# Patient Record
Sex: FEMALE | Race: BLACK OR AFRICAN AMERICAN | Employment: FULL TIME | URBAN - METROPOLITAN AREA
[De-identification: names, ages, dates, MRNs, and addresses within clinical notes are randomized per-mention and may not be internally consistent; named-entity substitution may affect disease eponyms.]

---

## 2018-04-26 ENCOUNTER — HOSPITAL ENCOUNTER (OUTPATIENT)
Dept: GENERAL RADIOLOGY | Age: 33
Discharge: HOME | End: 2018-04-26
Attending: INTERNAL MEDICINE
Payer: MEDICAID

## 2018-04-26 DIAGNOSIS — M25.512 SHOULDER PAIN, LEFT: ICD-10-CM

## 2018-04-26 DIAGNOSIS — M54.9 BACK PAIN: ICD-10-CM

## 2018-04-26 PROCEDURE — 72110 X-RAY EXAM L-2 SPINE 4/>VWS: CPT

## 2018-04-26 PROCEDURE — 73030 X-RAY EXAM OF SHOULDER: CPT

## 2018-07-09 ENCOUNTER — HOSPITAL ENCOUNTER (OUTPATIENT)
Dept: MRI IMAGING | Age: 33
Discharge: HOME | End: 2018-07-09
Attending: INTERNAL MEDICINE

## 2018-07-20 ENCOUNTER — HOSPITAL ENCOUNTER (OUTPATIENT)
Dept: MRI IMAGING | Age: 33
Discharge: HOME | End: 2018-07-20
Attending: INTERNAL MEDICINE
Payer: MEDICAID

## 2018-07-20 DIAGNOSIS — M54.9 BACK PAIN: ICD-10-CM

## 2018-07-20 PROCEDURE — 72148 MRI LUMBAR SPINE W/O DYE: CPT

## 2018-12-20 ENCOUNTER — HOSPITAL ENCOUNTER (OUTPATIENT)
Dept: MAMMOGRAPHY | Age: 33
Discharge: HOME | End: 2018-12-20
Attending: INTERNAL MEDICINE
Payer: MEDICAID

## 2018-12-20 DIAGNOSIS — N60.02 CYST OF LEFT BREAST: ICD-10-CM

## 2018-12-20 PROCEDURE — 77066 DX MAMMO INCL CAD BI: CPT

## 2019-04-09 ENCOUNTER — HOSPITAL ENCOUNTER (OUTPATIENT)
Dept: MAMMOGRAPHY | Age: 34
Discharge: HOME | End: 2019-04-09
Attending: INTERNAL MEDICINE
Payer: MEDICAID

## 2019-04-09 DIAGNOSIS — N63.20 LEFT BREAST MASS: ICD-10-CM

## 2019-04-09 PROCEDURE — 77065 DX MAMMO INCL CAD UNI: CPT

## 2020-07-08 ENCOUNTER — HOSPITAL ENCOUNTER (OUTPATIENT)
Dept: GENERAL RADIOLOGY | Age: 35
Discharge: HOME | End: 2020-07-08
Attending: INTERNAL MEDICINE
Payer: MEDICAID

## 2020-07-08 DIAGNOSIS — G89.29 CHRONIC TMJ PAIN: ICD-10-CM

## 2020-07-08 DIAGNOSIS — M26.629 CHRONIC TMJ PAIN: ICD-10-CM

## 2020-07-08 PROCEDURE — 70330 X-RAY EXAM OF JAW JOINTS: CPT

## 2022-05-26 ENCOUNTER — HOSPITAL ENCOUNTER (OUTPATIENT)
Dept: GENERAL RADIOLOGY | Age: 37
Discharge: HOME | End: 2022-05-29
Payer: COMMERCIAL

## 2022-05-26 DIAGNOSIS — M54.50 LOW BACK PAIN, UNSPECIFIED BACK PAIN LATERALITY, UNSPECIFIED CHRONICITY, UNSPECIFIED WHETHER SCIATICA PRESENT: ICD-10-CM

## 2022-05-26 PROCEDURE — 72100 X-RAY EXAM L-S SPINE 2/3 VWS: CPT

## 2022-12-19 ENCOUNTER — HOSPITAL ENCOUNTER (EMERGENCY)
Age: 37
Discharge: HOME | End: 2022-12-19
Attending: EMERGENCY MEDICINE
Payer: MEDICAID

## 2022-12-19 VITALS
RESPIRATION RATE: 16 BRPM | SYSTOLIC BLOOD PRESSURE: 135 MMHG | HEART RATE: 81 BPM | TEMPERATURE: 98.8 F | BODY MASS INDEX: 47.09 KG/M2 | DIASTOLIC BLOOD PRESSURE: 90 MMHG | OXYGEN SATURATION: 97 % | HEIGHT: 66 IN | WEIGHT: 293 LBS

## 2022-12-19 DIAGNOSIS — E11.65 UNCONTROLLED TYPE 2 DIABETES MELLITUS WITH HYPERGLYCEMIA (HCC): ICD-10-CM

## 2022-12-19 DIAGNOSIS — R73.9 HYPERGLYCEMIA: Primary | ICD-10-CM

## 2022-12-19 LAB
ALBUMIN SERPL-MCNC: 4 G/DL (ref 3.5–5)
ALBUMIN/GLOB SERPL: 1.3 {RATIO} (ref 0.4–1.6)
ALP SERPL-CCNC: 126 U/L (ref 45–117)
ALT SERPL-CCNC: 32 U/L (ref 13–61)
ANION GAP SERPL CALC-SCNC: 13 MMOL/L (ref 2–11)
APPEARANCE UR: CLEAR
AST SERPL-CCNC: 35 U/L (ref 15–37)
BASE DEFICIT BLDV-SCNC: 1.3 MMOL/L
BASOPHILS # BLD: 0 K/UL (ref 0–0.2)
BASOPHILS NFR BLD: 0 % (ref 0–2)
BILIRUB SERPL-MCNC: 0.2 MG/DL (ref 0.2–1.1)
BILIRUB UR QL: NEGATIVE
BUN SERPL-MCNC: 13 MG/DL (ref 7–18)
CALCIUM SERPL-MCNC: 9 MG/DL (ref 8.3–10.4)
CHLORIDE SERPL-SCNC: 99 MMOL/L (ref 98–107)
CHP ED QC CHECK: YES
CO2 BLD-SCNC: 25 MMOL/L (ref 13–23)
CO2 SERPL-SCNC: 22 MMOL/L (ref 21–32)
COLOR UR: YELLOW
CREAT SERPL-MCNC: 0.9 MG/DL (ref 0.6–1)
DIFFERENTIAL METHOD BLD: ABNORMAL
EOSINOPHIL # BLD: 0.2 K/UL (ref 0–0.8)
EOSINOPHIL NFR BLD: 2 % (ref 0.5–7.8)
ERYTHROCYTE [DISTWIDTH] IN BLOOD BY AUTOMATED COUNT: 12.2 % (ref 11.9–14.6)
EST. AVERAGE GLUCOSE BLD GHB EST-MCNC: 232 MG/DL
GLOBULIN SER CALC-MCNC: 3 G/DL (ref 2.8–4.5)
GLUCOSE BLD STRIP.AUTO-MCNC: 254 MG/DL (ref 65–100)
GLUCOSE BLD STRIP.AUTO-MCNC: 327 MG/DL (ref 65–100)
GLUCOSE BLD-MCNC: 327 MG/DL
GLUCOSE SERPL-MCNC: 346 MG/DL (ref 65–100)
GLUCOSE UR STRIP.AUTO-MCNC: 500 MG/DL
HBA1C MFR BLD: 9.7 % (ref 4.8–5.6)
HCG UR QL: NEGATIVE
HCO3 BLDV-SCNC: 24 MMOL/L (ref 23–28)
HCT VFR BLD AUTO: 40.1 % (ref 35.8–46.3)
HGB BLD-MCNC: 13.7 G/DL (ref 11.7–15.4)
HGB UR QL STRIP: NEGATIVE
IMM GRANULOCYTES # BLD AUTO: 0 K/UL (ref 0–0.5)
IMM GRANULOCYTES NFR BLD AUTO: 0 % (ref 0–5)
KETONES UR QL STRIP.AUTO: 40 MG/DL
LEUKOCYTE ESTERASE UR QL STRIP.AUTO: NEGATIVE
LYMPHOCYTES # BLD: 3.6 K/UL (ref 0.5–4.6)
LYMPHOCYTES NFR BLD: 32 % (ref 13–44)
MCH RBC QN AUTO: 30.2 PG (ref 26.1–32.9)
MCHC RBC AUTO-ENTMCNC: 34.2 G/DL (ref 31.4–35)
MCV RBC AUTO: 88.3 FL (ref 82–102)
MONOCYTES # BLD: 0.7 K/UL (ref 0.1–1.3)
MONOCYTES NFR BLD: 6 % (ref 4–12)
NEUTS SEG # BLD: 6.6 K/UL (ref 1.7–8.2)
NEUTS SEG NFR BLD: 59 % (ref 43–78)
NITRITE UR QL STRIP.AUTO: NEGATIVE
NRBC # BLD: 0 K/UL (ref 0–0.2)
PH BLDV: 7.37 [PH] (ref 7.32–7.42)
PH UR STRIP: 6.5 [PH] (ref 5–9)
PLATELET # BLD AUTO: 279 K/UL (ref 150–450)
PMV BLD AUTO: 10.9 FL (ref 9.4–12.3)
PO2 BLDV: 70 MMHG
POTASSIUM SERPL-SCNC: 4.1 MMOL/L (ref 3.5–5.1)
PROT SERPL-MCNC: 7 G/DL (ref 6.4–8.2)
PROT UR STRIP-MCNC: NEGATIVE MG/DL
RBC # BLD AUTO: 4.54 M/UL (ref 4.05–5.2)
SAO2 % BLDV: 93.2 % (ref 65–88)
SERVICE CMNT-IMP: ABNORMAL
SODIUM SERPL-SCNC: 134 MMOL/L (ref 133–143)
SP GR UR REFRACTOMETRY: 1.02 (ref 1–1.02)
SPECIMEN TYPE: ABNORMAL
UROBILINOGEN UR QL STRIP.AUTO: 0.2 EU/DL (ref 0.2–1)
WBC # BLD AUTO: 11.2 K/UL (ref 4.3–11.1)

## 2022-12-19 PROCEDURE — 82962 GLUCOSE BLOOD TEST: CPT

## 2022-12-19 PROCEDURE — 2580000003 HC RX 258: Performed by: EMERGENCY MEDICINE

## 2022-12-19 PROCEDURE — 85025 COMPLETE CBC W/AUTO DIFF WBC: CPT

## 2022-12-19 PROCEDURE — 80053 COMPREHEN METABOLIC PANEL: CPT

## 2022-12-19 PROCEDURE — 6370000000 HC RX 637 (ALT 250 FOR IP): Performed by: EMERGENCY MEDICINE

## 2022-12-19 PROCEDURE — 81003 URINALYSIS AUTO W/O SCOPE: CPT

## 2022-12-19 PROCEDURE — 99284 EMERGENCY DEPT VISIT MOD MDM: CPT

## 2022-12-19 PROCEDURE — 96374 THER/PROPH/DIAG INJ IV PUSH: CPT

## 2022-12-19 PROCEDURE — 83036 HEMOGLOBIN GLYCOSYLATED A1C: CPT

## 2022-12-19 PROCEDURE — 82803 BLOOD GASES ANY COMBINATION: CPT

## 2022-12-19 PROCEDURE — 81025 URINE PREGNANCY TEST: CPT

## 2022-12-19 RX ORDER — 0.9 % SODIUM CHLORIDE 0.9 %
1000 INTRAVENOUS SOLUTION INTRAVENOUS ONCE
Status: COMPLETED | OUTPATIENT
Start: 2022-12-19 | End: 2022-12-19

## 2022-12-19 RX ADMIN — INSULIN HUMAN 5 UNITS: 100 INJECTION, SOLUTION PARENTERAL at 01:41

## 2022-12-19 RX ADMIN — SODIUM CHLORIDE 1000 ML: 9 INJECTION, SOLUTION INTRAVENOUS at 00:49

## 2022-12-19 ASSESSMENT — ENCOUNTER SYMPTOMS
DIARRHEA: 0
SHORTNESS OF BREATH: 0
VOMITING: 0
RHINORRHEA: 0
ABDOMINAL PAIN: 0
COUGH: 0
NAUSEA: 0

## 2022-12-19 ASSESSMENT — PAIN - FUNCTIONAL ASSESSMENT: PAIN_FUNCTIONAL_ASSESSMENT: 0-10

## 2022-12-19 ASSESSMENT — PAIN SCALES - GENERAL: PAINLEVEL_OUTOF10: 0

## 2022-12-19 NOTE — ED NOTES
Report received from [facility].   Assumed care of the pt     Micha Morelos RN  12/19/22 1945

## 2022-12-19 NOTE — ED NOTES
Pt presents to the ED for c/o elevated BS.   States that she does take 5420 Martin Landry RN  12/19/22 1048

## 2022-12-19 NOTE — ED NOTES
I have reviewed discharge instructions with the patient. The patient verbalized understanding. Patient left ED via Discharge Method:ambulatory to Home with (family self,). Opportunity for questions and clarification provided. Patient given 1 scripts. To continue your aftercare when you leave the hospital, you may receive an automated call from our care team to check in on how you are doing. This is a free service and part of our promise to provide the best care and service to meet your aftercare needs.  If you have questions, or wish to unsubscribe from this service please call [phone]. Thank you for Choosing our [facility] Emergency Department.        Alexandru No RN  12/19/22 1144

## 2022-12-19 NOTE — DISCHARGE INSTRUCTIONS
Please closely monitor blood glucose and follow diabetic diet. Will switch to metformin 500 mg twice daily. Schedule close follow-up with your primary care physician. Please return if symptoms worsen or progress in any way.

## 2022-12-19 NOTE — ED PROVIDER NOTES
Emergency Department Provider Note                   PCP:                Han Elizabeth MD               Age: 43 y.o. Sex: female       ICD-10-CM    1. Hyperglycemia  R73.9       2. Uncontrolled type 2 diabetes mellitus with hyperglycemia ([facility] [address]  E11.65           DISPOSITION Decision To Discharge 12/19/2022 02:10:46 AM        MDM  Number of Diagnoses or Management Options  Hyperglycemia: new, needed workup  Uncontrolled type 2 diabetes mellitus with hyperglycemia ([address]): new, needed workup  Diagnosis management comments: Normotensive. Afebrile. POC glucose 327. Orders placed for 1 L normal saline IV fluid bolus. Will order A1c which is send out lab to DT lab. Repeat glucose 254. UA pending. Urine preg negative. CO2 within normal limits at 22. Anion gap 13. UA with 40 ketones. Will check VBG. Pt with prolonged wait time in ED due to issues with epoc blood analysis device. Ultimately, VBG w/ pH 7.367. Will discharge home with instructions to take metformin 500 mg twice daily. Patient instructed on need for close follow-up with her PCP for further management. Instructed to follow diabetic diet. A1c is pending at this time. Amount and/or Complexity of Data Reviewed  Clinical lab tests: ordered and reviewed  Tests in the medicine section of CPT®: ordered and reviewed  Review and summarize past medical records: yes  Independent visualization of images, tracings, or specimens: yes    Risk of Complications, Morbidity, and/or Mortality  Presenting problems: moderate  Diagnostic procedures: moderate  Management options: moderate    Patient Progress  Patient progress: stable       ED Course as of 12/19/22 0258   Mon Dec 19, 2022   0101 Glucose, Random: 327 [DF]   0135 Glucose, Random(!): 346 [DF]   0240 Repeat glucose 254. UA pending. Normal CO2. AG 13. No acute concern at this time for DKA.   [DF]   3787 Ketones, Urine(!): 40 [DF]      ED Course User Index  [DF] Hannah Casas MD Orders Placed This Encounter   Procedures    CMP    CBC with Auto Differential    Pregnancy, Urine    Urinalysis w rflx microscopic    Hemoglobin A1C    Blood Gas, Venous    Misc nursing order (specify)    POCT Glucose    POCT Glucose    POCT Glucose    POCT Glucose    EKG 12 Lead        Medications   0.9 % sodium chloride bolus (0 mLs IntraVENous Stopped 12/19/22 0100)   insulin regular (HUMULIN R;NOVOLIN R) injection 5 Units (5 Units IntraVENous Given 12/19/22 0141)       New Prescriptions    METFORMIN (GLUCOPHAGE) 500 MG TABLET    Take 1 tablet by mouth 2 times daily (with meals)        Brisa Eden is a 43 y.o. female who presents to the Emergency Department with chief complaint of  hyperglycemia. Chief Complaint   Patient presents with    Blood Sugar Problem      18-year-old female with history of obesity, type 2 diabetes mellitus on metformin 5 mg once daily presents with complaint of hyperglycemia. Patient states that she has been checking her glucose at home she noted that has been significantly elevated. Patient states that yesterday blood glucose was elevated in the 400s. Patient states that today it has fluctuated from mid 200s to 300s. Patient states that she has had polyuria, polydipsia. Denies chest pain, shortness of breath, nausea, vomiting, fever, chills, abdominal pain, pelvic pain, dysuria. Patient states that her primary care doctor is Dr. Kasey Bro and was last seen in October. Denies any alleviating exacerbating factors. Rates symptoms as mild to moderate. States that she has not been following diabetic diet. States that she has been under increased stress recently. Reports drinking half a pint of Shahla on Friday as well as 2 Michelob ultra's. Patient smokes around a pack per day. States she has not been following diabetic diet. The history is provided by the patient. No  was used. Review of Systems   Constitutional:  Positive for fatigue. Negative for chills, diaphoresis and fever. HENT:  Negative for congestion and rhinorrhea. Eyes:  Negative for visual disturbance. Respiratory:  Negative for cough and shortness of breath. Cardiovascular:  Negative for chest pain and palpitations. Gastrointestinal:  Negative for abdominal pain, diarrhea, nausea and vomiting. Endocrine: Positive for polydipsia and polyuria. Genitourinary:  Negative for dysuria and flank pain. Musculoskeletal:  Negative for arthralgias and myalgias. Skin:  Negative for rash and wound. Neurological:  Negative for dizziness, syncope, weakness, light-headedness, numbness and headaches. Hematological:  Does not bruise/bleed easily. Psychiatric/Behavioral:  Negative for confusion. Past Medical History:   Diagnosis Date    Ill-defined condition         Past Medical history: Type II diabetes mellitus    Past surgical history: Denies any previous surgical history. Family history: Reviewed. Denies any pertinent family history. Social history: Smoking 1 pack/day for 10 years plus. Reports intermittent alcohol use. States that Friday evening she drank half a pint of Shahla as well as 2 Michelob ultra beers. Denies illicit drug use. Social History     Socioeconomic History    Marital status: Single     Spouse name: None    Number of children: None    Years of education: None    Highest education level: None   Tobacco Use    Smoking status: Former     Packs/day: 0.50     Types: Cigarettes   Substance and Sexual Activity    Alcohol use: No    Drug use: No         Hydrocodone-acetaminophen     Previous Medications    AMLODIPINE (NORVASC) 5 MG TABLET    Take 5 mg by mouth daily    LORAZEPAM (ATIVAN) 1 MG TABLET    Take 1 mg by mouth every 8 hours as needed. Vitals signs and nursing note reviewed.    Patient Vitals for the past 4 hrs:   Temp Pulse Resp BP SpO2   12/19/22 0229 -- 81 -- (!) 137/93 95 %   12/19/22 0214 -- 74 -- 122/79 98 %   12/19/22 0159 -- 76 -- 135/72 98 %   12/19/22 0144 -- 79 -- (!) 150/128 99 %   12/19/22 0129 -- 75 -- (!) 146/75 99 %   12/19/22 0114 -- 75 -- (!) 144/77 98 %   12/19/22 0059 -- 90 -- (!) 144/88 98 %   12/19/22 0036 98.8 °F (37.1 °C) 98 18 (!) 151/99 100 %          Physical Exam  Vitals and nursing note reviewed. Constitutional:       Appearance: She is obese. HENT:      Head: Normocephalic. Nose: Nose normal.      Mouth/Throat:      Mouth: Mucous membranes are moist.      Comments: MMM. Eyes:      Extraocular Movements: Extraocular movements intact. Conjunctiva/sclera: Conjunctivae normal.      Pupils: Pupils are equal, round, and reactive to light. Cardiovascular:      Rate and Rhythm: Normal rate. Pulses: Normal pulses. Heart sounds: Normal heart sounds. Pulmonary:      Effort: Pulmonary effort is normal.      Breath sounds: Normal breath sounds. Comments: CTAB. Abdominal:      General: Bowel sounds are normal.      Palpations: Abdomen is soft. Tenderness: There is no abdominal tenderness. There is no guarding or rebound. Comments: Soft, nontender, nondistended. No rebound or guarding. Musculoskeletal:         General: No tenderness. Normal range of motion. Skin:     General: Skin is warm. Findings: No erythema or rash. Neurological:      General: No focal deficit present. Mental Status: She is alert and oriented to person, place, and time. Cranial Nerves: No cranial nerve deficit. Sensory: No sensory deficit. Motor: No weakness.         EKG 12 Lead    Date/Time: 12/19/2022 1:05 AM  Performed by: Sheldon Swann MD  Authorized by: Sheldon Swann MD     ECG reviewed by ED Physician in the absence of a cardiologist: yes    Rate:     ECG rate:  95    ECG rate assessment: normal    Rhythm:     Rhythm: sinus rhythm    Ectopy:     Ectopy: none    QRS:     QRS axis:  Normal    QRS intervals:  Normal    QRS conduction: normal    ST segments:     ST segments:  Normal  T waves:     T waves: normal      Results for orders placed or performed during the hospital encounter of 12/19/22   CMP   Result Value Ref Range    Sodium 134 133 - 143 mmol/L    Potassium 4.1 3.5 - 5.1 mmol/L    Chloride 99 98 - 107 mmol/L    CO2 22 21 - 32 mmol/L    Anion Gap 13 (H) 2 - 11 mmol/L    Glucose 346 (H) 65 - 100 mg/dL    BUN 13 7.0 - 18.0 MG/DL    Creatinine 0.90 0.6 - 1.0 MG/DL    Est, Glom Filt Rate >60 >60 ml/min/1.73m2    Calcium 9.0 8.3 - 10.4 MG/DL    Total Bilirubin 0.2 0.2 - 1.1 MG/DL    ALT 32 13.0 - 61.0 U/L    AST 35 15 - 37 U/L    Alk Phosphatase 126 (H) 45.0 - 117.0 U/L    Total Protein 7.0 6.4 - 8.2 g/dL    Albumin 4.0 3.5 - 5.0 g/dL    Globulin 3.0 2.8 - 4.5 g/dL    Albumin/Globulin Ratio 1.3 0.4 - 1.6     CBC with Auto Differential   Result Value Ref Range    WBC 11.2 (H) 4.3 - 11.1 K/uL    RBC 4.54 4.05 - 5.20 M/uL    Hemoglobin 13.7 11.7 - 15.4 g/dL    Hematocrit 40.1 35.8 - 46.3 %    MCV 88.3 82.0 - 102.0 FL    MCH 30.2 26.1 - 32.9 PG    MCHC 34.2 31.4 - 35.0 g/dL    RDW 12.2 11.9 - 14.6 %    Platelets 077 776 - 460 K/uL    MPV 10.9 9.4 - 12.3 FL    nRBC 0.00 0.0 - 0.2 K/uL    Differential Type AUTOMATED      Seg Neutrophils 59 43 - 78 %    Lymphocytes 32 13 - 44 %    Monocytes 6 4.0 - 12.0 %    Eosinophils % 2 0.5 - 7.8 %    Basophils 0 0.0 - 2.0 %    Immature Granulocytes 0 0.0 - 5.0 %    Segs Absolute 6.6 1.7 - 8.2 K/UL    Absolute Lymph # 3.6 0.5 - 4.6 K/UL    Absolute Mono # 0.7 0.1 - 1.3 K/UL    Absolute Eos # 0.2 0.0 - 0.8 K/UL    Basophils Absolute 0.0 0.0 - 0.2 K/UL    Absolute Immature Granulocyte 0.0 0.0 - 0.5 K/UL   Pregnancy, Urine   Result Value Ref Range    HCG(Urine) Pregnancy Test Negative     Urinalysis w rflx microscopic   Result Value Ref Range    Color, UA YELLOW      Appearance CLEAR      Specific Gravity, UA 1.020 1.001 - 1.023      pH, Urine 6.5 5.0 - 9.0      Protein, UA Negative NEG mg/dL    Glucose,  mg/dL Ketones, Urine 40 (A) NEG mg/dL    Bilirubin Urine Negative NEG      Blood, Urine Negative NEG      Urobilinogen, Urine 0.2 0.2 - 1.0 EU/dL    Nitrite, Urine Negative NEG      Leukocyte Esterase, Urine Negative NEG     POCT Glucose   Result Value Ref Range    Glucose 327 mg/dL    QC OK? yes    POCT Glucose   Result Value Ref Range    POC Glucose 327 (H) 65 - 100 mg/dL    Performed by: [place])    POCT Glucose   Result Value Ref Range    POC Glucose 254 (H) 65 - 100 mg/dL    Performed by: [place])    EKG 12 Lead    Narrative    Tolu Faustin MD     12/19/2022  2:45 AM  EKG 12 Lead    Date/Time: 12/19/2022 1:05 AM  Performed by: Linda Horton MD  Authorized by: Linda Horton MD     ECG reviewed by ED Physician in the absence of a cardiologist: yes    Rate:     ECG rate:  95    ECG rate assessment: normal    Rhythm:     Rhythm: sinus rhythm    Ectopy:     Ectopy: none    QRS:     QRS axis:  Normal    QRS intervals:  Normal    QRS conduction: normal    ST segments:     ST segments:  Normal  T waves:     T waves: normal          No orders to display                       Voice dictation software was used during the making of this note. This software is not perfect and grammatical and other typographical errors may be present. This note has not been completely proofread for errors.      Linda Horton MD  12/19/22 0245       Tolu Faustin MD  12/19/22 4604

## 2023-01-22 ENCOUNTER — HOSPITAL ENCOUNTER (EMERGENCY)
Age: 38
Discharge: HOME | End: 2023-01-22
Attending: EMERGENCY MEDICINE
Payer: MEDICAID

## 2023-01-22 VITALS
TEMPERATURE: 98.4 F | BODY MASS INDEX: 47.09 KG/M2 | RESPIRATION RATE: 18 BRPM | HEART RATE: 83 BPM | SYSTOLIC BLOOD PRESSURE: 128 MMHG | OXYGEN SATURATION: 100 % | HEIGHT: 66 IN | DIASTOLIC BLOOD PRESSURE: 82 MMHG | WEIGHT: 293 LBS

## 2023-01-22 DIAGNOSIS — L02.91 ABSCESS: Primary | ICD-10-CM

## 2023-01-22 PROCEDURE — 87205 SMEAR GRAM STAIN: CPT

## 2023-01-22 PROCEDURE — 99283 EMERGENCY DEPT VISIT LOW MDM: CPT

## 2023-01-22 PROCEDURE — 87075 CULTR BACTERIA EXCEPT BLOOD: CPT

## 2023-01-22 PROCEDURE — 10060 I&D ABSCESS SIMPLE/SINGLE: CPT

## 2023-01-22 PROCEDURE — 10120 INC&RMVL FB SUBQ TISS SMPL: CPT

## 2023-01-22 RX ORDER — OMEPRAZOLE 40 MG/1
CAPSULE, DELAYED RELEASE ORAL
COMMUNITY
Start: 2019-11-03

## 2023-01-22 RX ORDER — ERGOCALCIFEROL 1.25 MG/1
CAPSULE ORAL
COMMUNITY
Start: 2019-11-11

## 2023-01-22 RX ORDER — ESCITALOPRAM OXALATE 20 MG/1
TABLET ORAL
COMMUNITY

## 2023-01-22 RX ORDER — LISINOPRIL 5 MG/1
5 TABLET ORAL
COMMUNITY

## 2023-01-22 RX ORDER — DOXYCYCLINE HYCLATE 100 MG
100 TABLET ORAL 2 TIMES DAILY
Qty: 20 TABLET | Refills: 0 | Status: SHIPPED | OUTPATIENT
Start: 2023-01-22 | End: 2023-01-24 | Stop reason: ALTCHOICE

## 2023-01-22 ASSESSMENT — PAIN SCALES - GENERAL: PAINLEVEL_OUTOF10: 7

## 2023-01-22 ASSESSMENT — ENCOUNTER SYMPTOMS
VOMITING: 0
RESPIRATORY NEGATIVE: 1
GASTROINTESTINAL NEGATIVE: 1
ALLERGIC/IMMUNOLOGIC NEGATIVE: 1
EYES NEGATIVE: 1
NAUSEA: 0

## 2023-01-22 ASSESSMENT — LIFESTYLE VARIABLES
HOW MANY STANDARD DRINKS CONTAINING ALCOHOL DO YOU HAVE ON A TYPICAL DAY: PATIENT DOES NOT DRINK
HOW OFTEN DO YOU HAVE A DRINK CONTAINING ALCOHOL: NEVER

## 2023-01-22 ASSESSMENT — PAIN DESCRIPTION - LOCATION: LOCATION: GROIN

## 2023-01-22 ASSESSMENT — PAIN DESCRIPTION - PAIN TYPE: TYPE: ACUTE PAIN

## 2023-01-22 ASSESSMENT — PAIN - FUNCTIONAL ASSESSMENT: PAIN_FUNCTIONAL_ASSESSMENT: 0-10

## 2023-01-22 ASSESSMENT — PAIN DESCRIPTION - ORIENTATION: ORIENTATION: RIGHT

## 2023-01-22 NOTE — ED NOTES
I have reviewed discharge instructions with the patient. The patient verbalized understanding. Patient left ED via Discharge Method: ambulatory to Home with self. Opportunity for questions and clarification provided. Patient given 1 scripts. To continue your aftercare when you leave the hospital, you may receive an automated call from our care team to check in on how you are doing. This is a free service and part of our promise to provide the best care and service to meet your aftercare needs.  If you have questions, or wish to unsubscribe from this service please call [phone]. Thank you for Choosing our [facility] Emergency Department.         Andrés Fierro RN  01/22/23 7078

## 2023-01-22 NOTE — ED PROVIDER NOTES
Emergency Department Provider Note                   PCP:                Pj Hinojosa MD               Age: 37 y.o.      Sex: female       ICD-10-CM    1. Abscess  L02.91           DISPOSITION Decision To Discharge 01/22/2023 01:05:37 PM        Medical Decision Making  Wash two-three times daily with soap and water, blot dry, apply neosporin and a clean dressing. Watch for redness, swelling, pus, increasing pain, fever and return if any of those symptoms begin. Finish all of the antibiotics. Return to the ED in two days for packing removal and sooner if worse. Patient is stable for discharge and ambulatory out of the ED without difficulty.   Patient presented with acute abscess to the ED. History obtained from patient. I ordered the following tests wound culture, no hx  of MRSA that she is aware of but prior abscesses. I did review records from an external source. I did engage in shared decision making with the patient/family. I considered blood work but did not do it due to normal vital signs, no abnormalities, abscess superficial. There is no social determinant of health affecting care. I did not use a clinical guideline to determine care of the patient. I discussed results/disposition with patient.         Amount and/or Complexity of Data Reviewed  Labs: ordered. Decision-making details documented in ED Course.    Risk  Prescription drug management.                                Orders Placed This Encounter   Procedures    INCISION AND DRAINAGE    Culture, Anaerobic    Culture, Wound Aerobic Only        Medications - No data to display    New Prescriptions    DOXYCYCLINE HYCLATE (VIBRA-TABS) 100 MG TABLET    Take 1 tablet by mouth 2 times daily for 10 days        Bia Doshi is a 37 y.o. female who presents to the Emergency Department with chief complaint of    Chief Complaint   Patient presents with    Abscess      Patient is here with a abscess to the right lower quadrant of the anterior abdomen that  is superficial.  It started 2 days ago. She is a diabetic. She has had them in her breast before and 1 from her breast had to be lanced. No fever, chest pain, shortness of breath, upper abdominal pain, dizziness, weakness, dyspnea exertion, orthopnea, swelling/tingling or weakness to their arms or legs, trouble with urination or bowel movements or other new symptoms. They did ambulate to the room without difficulty and is well hydrated. The history is provided by the patient. Abscess  Location:  Torso  Torso abscess location:  Abd RLQ  Size:  2x2 cm  Abscess quality: fluctuance, painful and redness    Abscess quality: not draining, no induration, no itching, no warmth and not weeping    Red streaking: no    Duration:  2 days  Progression:  Unchanged  Pain details:     Severity:  Moderate    Duration:  2 days    Timing:  Constant    Progression:  Worsening  Chronicity:  New  Context: diabetes    Context: not immunosuppression, not injected drug use, not insect bite/sting and not skin injury    Relieved by:  Nothing  Worsened by:  Nothing  Ineffective treatments:  None tried  Associated symptoms: no anorexia, no fatigue, no fever, no headaches, no nausea and no vomiting        Review of Systems   Constitutional: Negative. Negative for fatigue and fever. HENT: Negative. Eyes: Negative. Respiratory: Negative. Cardiovascular: Negative. Gastrointestinal: Negative. Negative for anorexia, nausea and vomiting. Endocrine: Negative. Genitourinary: Negative. Musculoskeletal: Negative. Skin:  Negative for wound. Allergic/Immunologic: Negative. Neurological: Negative. Negative for headaches. Hematological: Negative. Psychiatric/Behavioral: Negative. All other systems reviewed and are negative. Past Medical History:   Diagnosis Date    Ill-defined condition         No past surgical history on file. No family history on file.      Social History     Socioeconomic History Marital status: Single   Tobacco Use    Smoking status: Former     Packs/day: 0.50     Types: Cigarettes   Substance and Sexual Activity    Alcohol use: No    Drug use: No         Hydrocodone-acetaminophen     Previous Medications    AMLODIPINE (NORVASC) 5 MG TABLET    Take 5 mg by mouth daily    ERGOCALCIFEROL (ERGOCALCIFEROL) 1.25 MG (53542 UT) CAPSULE    TAKE 1 TABLET BY MOUTH THREE TIMES A MONTH    ESCITALOPRAM (LEXAPRO) 20 MG TABLET    escitalopram 20 mg tablet   TAKE 1 TABLET BY MOUTH EVERY DAY    LISINOPRIL (PRINIVIL;ZESTRIL) 5 MG TABLET    Take 5 mg by mouth    LORAZEPAM (ATIVAN) 1 MG TABLET    Take 1 mg by mouth every 8 hours as needed. METFORMIN (GLUCOPHAGE) 500 MG TABLET    Take 1 tablet by mouth 2 times daily (with meals)    OMEPRAZOLE (PRILOSEC) 40 MG DELAYED RELEASE CAPSULE    omeprazole 40 mg capsule,delayed release   Take 1 capsule every day by oral route. Vitals signs and nursing note reviewed. Patient Vitals for the past 4 hrs:   Temp Pulse Resp BP SpO2   01/22/23 1300 -- -- -- 128/82 100 %   01/22/23 1201 98.4 °F (36.9 °C) 83 18 (!) 153/80 95 %          Physical Exam  Vitals and nursing note reviewed. Constitutional:       Appearance: Normal appearance. She is obese. HENT:      Head: Normocephalic and atraumatic. Right Ear: External ear normal.      Left Ear: External ear normal.      Nose: Nose normal.      Mouth/Throat:      Mouth: Mucous membranes are moist.   Eyes:      Extraocular Movements: Extraocular movements intact. Conjunctiva/sclera: Conjunctivae normal.      Pupils: Pupils are equal, round, and reactive to light. Cardiovascular:      Rate and Rhythm: Normal rate. Pulses: Normal pulses. Pulmonary:      Effort: Pulmonary effort is normal.   Abdominal:      General: Abdomen is flat. Bowel sounds are normal.      Palpations: Abdomen is soft.           Comments: 2x2 cm fluctuant abscess to right lower abdomen   Musculoskeletal:         General: Normal range of motion. Cervical back: Normal range of motion. Skin:     General: Skin is warm. Capillary Refill: Capillary refill takes less than 2 seconds. Coloration: Skin is not jaundiced or pale. Findings: Erythema present. No bruising, lesion or rash. Neurological:      General: No focal deficit present. Mental Status: She is alert and oriented to person, place, and time. Mental status is at baseline. Psychiatric:         Mood and Affect: Mood normal.         Behavior: Behavior normal.         Thought Content:  Thought content normal.         Judgment: Judgment normal.        Incision/Drainage    Date/Time: 1/22/2023 12:42 PM  Performed by: FIOR Edwards  Authorized by: Betty Echeverria MD     Consent:     Consent obtained:  Verbal    Consent given by:  Patient    Risks, benefits, and alternatives were discussed: yes      Risks discussed:  Bleeding, incomplete drainage, pain, damage to other organs and infection    Alternatives discussed:  No treatment, delayed treatment and referral  Universal protocol:     Procedure explained and questions answered to patient or proxy's satisfaction: yes      Relevant documents present and verified: yes      Test results available : no      Imaging studies available: no      Required blood products, implants, devices, and special equipment available: no      Site/side marked: yes      Immediately prior to procedure, a time out was called: yes      Patient identity confirmed:  Verbally with patient and arm band  Location:     Type:  Abscess    Size:  2x2 cm    Location:  Trunk    Trunk location:  Abdomen  Pre-procedure details:     Skin preparation:  Povidone-iodine  Sedation:     Sedation type:  None  Anesthesia:     Anesthesia method:  Local infiltration  Procedure type:     Complexity:  Simple  Procedure details:     Needle aspiration: no      Incision types:  Single straight    Wound management:  Probed and deloculated    Drainage: Purulent    Drainage amount:  Copious    Wound treatment:  Wound left open    Packing materials:  1/4 in iodoform gauze  Post-procedure details:     Procedure completion:  Tolerated well, no immediate complications    No results found for any visits on 01/22/23.     No orders to display                       Voice dictation software was used during the making of this note.  This software is not perfect and grammatical and other typographical errors may be present.  This note has not been completely proofread for errors.     FIOR Hoang  01/22/23 8190

## 2023-01-22 NOTE — Clinical Note
Yesika Ramírez was seen and treated in our emergency department on 1/22/2023. She may return to work on 01/25/2023. If you have any questions or concerns, please don't hesitate to call.       FIOR Arroyo

## 2023-01-22 NOTE — ED TRIAGE NOTES
Patient has an abscess on the inner side of her groin area that has been there for 2 days. Patient states it's painful.

## 2023-01-24 ENCOUNTER — HOSPITAL ENCOUNTER (EMERGENCY)
Age: 38
Discharge: HOME | End: 2023-01-24
Attending: EMERGENCY MEDICINE

## 2023-01-24 VITALS
BODY MASS INDEX: 47.09 KG/M2 | HEIGHT: 66 IN | WEIGHT: 293 LBS | DIASTOLIC BLOOD PRESSURE: 85 MMHG | TEMPERATURE: 98.2 F | OXYGEN SATURATION: 100 % | HEART RATE: 80 BPM | SYSTOLIC BLOOD PRESSURE: 145 MMHG | RESPIRATION RATE: 18 BRPM

## 2023-01-24 DIAGNOSIS — Z51.89 WOUND CHECK, ABSCESS: Primary | ICD-10-CM

## 2023-01-24 LAB
BACTERIA SPEC CULT: ABNORMAL
BACTERIA SPEC CULT: ABNORMAL
GRAM STN SPEC: ABNORMAL
SERVICE CMNT-IMP: ABNORMAL

## 2023-01-24 PROCEDURE — 4500000002 HC ER NO CHARGE

## 2023-01-24 RX ORDER — PENICILLIN V POTASSIUM 500 MG/1
500 TABLET ORAL 3 TIMES DAILY
Qty: 21 TABLET | Refills: 0 | Status: SHIPPED | OUTPATIENT
Start: 2023-01-24 | End: 2023-01-31

## 2023-01-24 ASSESSMENT — PAIN SCALES - GENERAL
PAINLEVEL_OUTOF10: 0
PAINLEVEL_OUTOF10: 0

## 2023-01-24 ASSESSMENT — PAIN - FUNCTIONAL ASSESSMENT
PAIN_FUNCTIONAL_ASSESSMENT: NONE - DENIES PAIN
PAIN_FUNCTIONAL_ASSESSMENT: 0-10

## 2023-01-24 ASSESSMENT — PAIN DESCRIPTION - LOCATION: LOCATION: GROIN

## 2023-01-24 NOTE — PROGRESS NOTES
ED pharmacist reviewed recent results of wound culture. The patient was treated with I&D in the emergency department and received a prescription for penicillin 500mg TID x 7d upon discharge. Based on culture results, the patient is being adequately treated for the identified infection with existing antimicrobial therapy. No further intervention needed.     Allergies as of 01/22/2023 - Fully Reviewed 01/22/2023   Allergen Reaction Noted    Hydrocodone-acetaminophen Dizziness or Vertigo and Nausea And Vomiting 02/13/2016     Thank You,  Reinaldo Velasquez, PharmD  PGY-1 Resident

## 2023-01-24 NOTE — ED NOTES
I have reviewed discharge instructions with the patient. The patient verbalized understanding. Patient left ED via Discharge Method: ambulatory to Home with (self    Opportunity for questions and clarification provided. Patient given 1 scripts. To continue your aftercare when you leave the hospital, you may receive an automated call from our care team to check in on how you are doing. This is a free service and part of our promise to provide the best care and service to meet your aftercare needs.  If you have questions, or wish to unsubscribe from this service please call [phone]. Thank you for Choosing our [facility] Emergency Department.         Patricia Khanna RN  01/24/23 3836

## 2023-01-24 NOTE — ED PROVIDER NOTES
Emergency Department Provider Note                   PCP:                Kj Farnsworth MD               Age: 42 y.o. Sex: female       ICD-10-CM    1. Wound check, abscess  Z51.89           DISPOSITION Decision To Discharge 01/24/2023 09:25:36 AM        Medical Decision Making  Her wound culture showed heavy growth of group B strep. I will switch her prescription to penicillin. I will give her instructions to continue to keep the abscess clean. Risk  Prescription drug management. No orders of the defined types were placed in this encounter. Medications - No data to display    New Prescriptions    PENICILLIN V POTASSIUM (VEETID) 500 MG TABLET    Take 1 tablet by mouth 3 times daily for 7 days        Andressa Lott is a 42 y.o. female who presents to the Emergency Department with chief complaint of    Chief Complaint   Patient presents with    Other      24-year-old lady presents with concerns about needing the packing changed out of the right pubic area abscess drainage. She says she has been taking her antibiotics and keeping it clean. She denies any fevers or increased swelling. No other associated symptoms. Elements of this note were created using speech recognition software. As such, errors of speech recognition may be present. Review of Systems   Constitutional:  Negative for chills and fever. Skin:  Positive for wound. Past Medical History:   Diagnosis Date    Ill-defined condition         No past surgical history on file. No family history on file.      Social History     Socioeconomic History    Marital status: Single   Tobacco Use    Smoking status: Former     Packs/day: 0.50     Types: Cigarettes   Substance and Sexual Activity    Alcohol use: No    Drug use: No         Hydrocodone-acetaminophen     Previous Medications    AMLODIPINE (NORVASC) 5 MG TABLET    Take 5 mg by mouth daily    ERGOCALCIFEROL (ERGOCALCIFEROL) 1.25 MG (05642 UT) CAPSULE    TAKE 1 TABLET BY MOUTH THREE TIMES A MONTH    ESCITALOPRAM (LEXAPRO) 20 MG TABLET    escitalopram 20 mg tablet   TAKE 1 TABLET BY MOUTH EVERY DAY    LISINOPRIL (PRINIVIL;ZESTRIL) 5 MG TABLET    Take 5 mg by mouth    LORAZEPAM (ATIVAN) 1 MG TABLET    Take 1 mg by mouth every 8 hours as needed. METFORMIN (GLUCOPHAGE) 500 MG TABLET    Take 1 tablet by mouth 2 times daily (with meals)    OMEPRAZOLE (PRILOSEC) 40 MG DELAYED RELEASE CAPSULE    omeprazole 40 mg capsule,delayed release   Take 1 capsule every day by oral route. Vitals signs and nursing note reviewed. Patient Vitals for the past 4 hrs:   Temp Pulse Resp BP SpO2   01/24/23 0921 98.2 °F (36.8 °C) 82 19 (!) 146/96 97 %          Physical Exam  Skin:     Comments: I remove the packing. There is not appear to be any additional drainage. I did not replace any packing. Procedures    No results found for any visits on 01/24/23. No orders to display                       Voice dictation software was used during the making of this note. This software is not perfect and grammatical and other typographical errors may be present. This note has not been completely proofread for errors.         Khadra Vuong MD  01/24/23 1992

## 2023-01-24 NOTE — DISCHARGE INSTRUCTIONS
Keep the area clean with soap and water and apply an antibacterial ointment twice daily. Please return with any fevers, increased swelling, worsening symptoms, or additional concerns. Stop taking the doxycycline and start taking the penicillin. Follow-up with your primary care doctor for reevaluation in 2 or 3 days.

## 2023-01-27 LAB
BACTERIA SPEC CULT: NORMAL
SERVICE CMNT-IMP: NORMAL

## 2023-03-29 ENCOUNTER — HOSPITAL ENCOUNTER (EMERGENCY)
Age: 38
Discharge: HOME | End: 2023-03-29
Attending: EMERGENCY MEDICINE
Payer: MEDICAID

## 2023-03-29 VITALS
RESPIRATION RATE: 18 BRPM | SYSTOLIC BLOOD PRESSURE: 139 MMHG | OXYGEN SATURATION: 98 % | HEART RATE: 72 BPM | TEMPERATURE: 98.6 F | DIASTOLIC BLOOD PRESSURE: 86 MMHG | HEIGHT: 66 IN | BODY MASS INDEX: 47.09 KG/M2 | WEIGHT: 293 LBS

## 2023-03-29 DIAGNOSIS — N89.8 VAGINAL IRRITATION: Primary | ICD-10-CM

## 2023-03-29 DIAGNOSIS — B37.31 CANDIDA VAGINITIS: ICD-10-CM

## 2023-03-29 LAB
APPEARANCE UR: CLEAR
BACTERIA URNS QL MICRO: ABNORMAL /HPF
BILIRUB UR QL: NEGATIVE
CASTS URNS QL MICRO: 0 /LPF
COLOR UR: YELLOW
CRYSTALS URNS QL MICRO: 0 /LPF
EPI CELLS #/AREA URNS HPF: ABNORMAL /HPF
GLUCOSE UR STRIP.AUTO-MCNC: >1000 MG/DL
HCG UR QL: NEGATIVE
HGB UR QL STRIP: ABNORMAL
KETONES UR QL STRIP.AUTO: 15 MG/DL
LEUKOCYTE ESTERASE UR QL STRIP.AUTO: NEGATIVE
MUCOUS THREADS URNS QL MICRO: 0 /LPF
NITRITE UR QL STRIP.AUTO: NEGATIVE
OTHER OBSERVATIONS: ABNORMAL
PH UR STRIP: 6 (ref 5–9)
PROT UR STRIP-MCNC: NEGATIVE MG/DL
RBC #/AREA URNS HPF: ABNORMAL /HPF
SERVICE CMNT-IMP: NORMAL
SP GR UR REFRACTOMETRY: 1.01 (ref 1–1.02)
UROBILINOGEN UR QL STRIP.AUTO: 0.2 EU/DL (ref 0.2–1)
WBC URNS QL MICRO: ABNORMAL /HPF
WET PREP GENITAL: NORMAL
YEAST URNS QL MICRO: ABNORMAL

## 2023-03-29 PROCEDURE — 87210 SMEAR WET MOUNT SALINE/INK: CPT

## 2023-03-29 PROCEDURE — 99283 EMERGENCY DEPT VISIT LOW MDM: CPT

## 2023-03-29 PROCEDURE — 87491 CHLMYD TRACH DNA AMP PROBE: CPT

## 2023-03-29 PROCEDURE — 81025 URINE PREGNANCY TEST: CPT

## 2023-03-29 PROCEDURE — 81001 URINALYSIS AUTO W/SCOPE: CPT

## 2023-03-29 RX ORDER — FLUCONAZOLE 150 MG/1
150 TABLET ORAL ONCE
Qty: 2 TABLET | Refills: 0 | Status: SHIPPED | OUTPATIENT
Start: 2023-03-29 | End: 2023-03-29

## 2023-03-29 ASSESSMENT — PAIN - FUNCTIONAL ASSESSMENT: PAIN_FUNCTIONAL_ASSESSMENT: NONE - DENIES PAIN

## 2023-03-29 ASSESSMENT — ENCOUNTER SYMPTOMS
VOMITING: 0
CHEST TIGHTNESS: 0
SORE THROAT: 0
NAUSEA: 0
BACK PAIN: 0
VOICE CHANGE: 0
FACIAL SWELLING: 0
EYE PAIN: 0
COUGH: 0
SHORTNESS OF BREATH: 0
ABDOMINAL PAIN: 0
TROUBLE SWALLOWING: 0

## 2023-03-29 NOTE — ED PROVIDER NOTES
Nitrite, Urine Negative NEG      Leukocyte Esterase, Urine Negative NEG     Urinalysis, Micro   Result Value Ref Range    WBC, UA 3-5 0 /hpf    RBC, UA 0-3 0 /hpf    Epithelial Cells UA 3-5 0 /hpf    BACTERIA, URINE 1+ (H) 0 /hpf    Casts 0 0 /lpf    Crystals 0 0 /LPF    Mucus, UA 0 0 /lpf    Yeast, UA OCCASIONAL      Other observations RESULTS VERIFIED MANUALLY          No orders to display                     Voice dictation software was used during the making of this note. This software is not perfect and grammatical and other typographical errors may be present. This note has not been completely proofread for errors.      Johanne Wilsonup, DO  03/29/23 9819

## 2023-03-29 NOTE — ED NOTES
I have reviewed discharge instructions with the patient. The patient verbalized understanding. Patient left ED via Discharge Method: ambulatory to Home self care    Opportunity for questions and clarification provided. Patient given 1 scripts. To continue your aftercare when you leave the hospital, you may receive an automated call from our care team to check in on how you are doing. This is a free service and part of our promise to provide the best care and service to meet your aftercare needs.  If you have questions, or wish to unsubscribe from this service please call [phone]. Thank you for Choosing our [facility] Emergency Department.        Lissa Sandifer, RN  03/29/23 9286

## 2023-03-29 NOTE — ED TRIAGE NOTES
Patient ambulatory in ED with complaint of vaginal irritation. Patient states no discharge, no smell, just itchiness. She states she had a cyst lanced about last month and since then has had nonstop irritation. She has used irritation cream such a monistat.

## 2023-04-01 LAB
C TRACH RRNA SPEC QL NAA+PROBE: NEGATIVE
N GONORRHOEA RRNA SPEC QL NAA+PROBE: NEGATIVE
SPECIMEN SOURCE: NORMAL

## 2023-05-24 ENCOUNTER — APPOINTMENT (OUTPATIENT)
Dept: CT IMAGING | Age: 38
End: 2023-05-24
Payer: MEDICAID

## 2023-05-24 ENCOUNTER — HOSPITAL ENCOUNTER (EMERGENCY)
Age: 38
Discharge: HOME | End: 2023-05-24
Attending: EMERGENCY MEDICINE
Payer: MEDICAID

## 2023-05-24 VITALS
WEIGHT: 293 LBS | DIASTOLIC BLOOD PRESSURE: 80 MMHG | TEMPERATURE: 98.1 F | OXYGEN SATURATION: 96 % | SYSTOLIC BLOOD PRESSURE: 148 MMHG | HEART RATE: 84 BPM | RESPIRATION RATE: 20 BRPM | HEIGHT: 66 IN | BODY MASS INDEX: 47.09 KG/M2

## 2023-05-24 DIAGNOSIS — R10.9 ACUTE ABDOMINAL PAIN: ICD-10-CM

## 2023-05-24 DIAGNOSIS — R10.9 ACUTE LEFT FLANK PAIN: Primary | ICD-10-CM

## 2023-05-24 LAB
ALBUMIN SERPL-MCNC: 3.8 G/DL (ref 3.5–5)
ALBUMIN/GLOB SERPL: 1 (ref 0.4–1.6)
ALP SERPL-CCNC: 162 U/L (ref 45–117)
ALT SERPL-CCNC: 14 U/L (ref 13–61)
ANION GAP SERPL CALC-SCNC: 11 MMOL/L (ref 2–11)
APPEARANCE UR: ABNORMAL
AST SERPL-CCNC: 25 U/L (ref 15–37)
BACTERIA URNS QL MICRO: NORMAL /HPF
BASOPHILS # BLD: 0 K/UL (ref 0–0.2)
BASOPHILS NFR BLD: 0 % (ref 0–2)
BILIRUB SERPL-MCNC: 0.2 MG/DL (ref 0.2–1.1)
BILIRUB UR QL: NEGATIVE
BUN SERPL-MCNC: 12 MG/DL (ref 7–18)
CALCIUM SERPL-MCNC: 9.1 MG/DL (ref 8.3–10.4)
CASTS URNS QL MICRO: 0 /LPF
CHLORIDE SERPL-SCNC: 105 MMOL/L (ref 98–107)
CO2 SERPL-SCNC: 23 MMOL/L (ref 21–32)
COLOR UR: YELLOW
CREAT SERPL-MCNC: 0.61 MG/DL (ref 0.6–1)
CRYSTALS URNS QL MICRO: 0 /LPF
DIFFERENTIAL METHOD BLD: ABNORMAL
EOSINOPHIL # BLD: 0.3 K/UL (ref 0–0.8)
EOSINOPHIL NFR BLD: 2 % (ref 0.5–7.8)
EPI CELLS #/AREA URNS HPF: NORMAL /HPF
ERYTHROCYTE [DISTWIDTH] IN BLOOD BY AUTOMATED COUNT: 12.8 % (ref 11.9–14.6)
GLOBULIN SER CALC-MCNC: 3.7 G/DL (ref 2.8–4.5)
GLUCOSE SERPL-MCNC: 115 MG/DL (ref 65–100)
GLUCOSE UR STRIP.AUTO-MCNC: NEGATIVE MG/DL
HCG UR QL: NEGATIVE
HCT VFR BLD AUTO: 37.7 % (ref 35.8–46.3)
HGB BLD-MCNC: 12.6 G/DL (ref 11.7–15.4)
HGB UR QL STRIP: ABNORMAL
IMM GRANULOCYTES # BLD AUTO: 0.1 K/UL (ref 0–0.5)
IMM GRANULOCYTES NFR BLD AUTO: 0 % (ref 0–5)
KETONES UR QL STRIP.AUTO: NEGATIVE MG/DL
LEUKOCYTE ESTERASE UR QL STRIP.AUTO: ABNORMAL
LIPASE SERPL-CCNC: 38 U/L (ref 13–60)
LYMPHOCYTES # BLD: 3.9 K/UL (ref 0.5–4.6)
LYMPHOCYTES NFR BLD: 32 % (ref 13–44)
MCH RBC QN AUTO: 30.1 PG (ref 26.1–32.9)
MCHC RBC AUTO-ENTMCNC: 33.4 G/DL (ref 31.4–35)
MCV RBC AUTO: 90 FL (ref 82–102)
MONOCYTES # BLD: 0.8 K/UL (ref 0.1–1.3)
MONOCYTES NFR BLD: 7 % (ref 4–12)
MUCOUS THREADS URNS QL MICRO: NORMAL /LPF
NEUTS SEG # BLD: 7.2 K/UL (ref 1.7–8.2)
NEUTS SEG NFR BLD: 59 % (ref 43–78)
NITRITE UR QL STRIP.AUTO: NEGATIVE
NRBC # BLD: 0 K/UL (ref 0–0.2)
OTHER OBSERVATIONS: NORMAL
PH UR STRIP: 6 (ref 5–9)
PLATELET # BLD AUTO: 248 K/UL (ref 150–450)
PMV BLD AUTO: 10.6 FL (ref 9.4–12.3)
POTASSIUM SERPL-SCNC: 4 MMOL/L (ref 3.5–5.1)
PROT SERPL-MCNC: 7.5 G/DL (ref 6.4–8.2)
PROT UR STRIP-MCNC: ABNORMAL MG/DL
RBC # BLD AUTO: 4.19 M/UL (ref 4.05–5.2)
RBC #/AREA URNS HPF: NORMAL /HPF
SODIUM SERPL-SCNC: 139 MMOL/L (ref 133–143)
SP GR UR REFRACTOMETRY: 1.02 (ref 1–1.02)
UROBILINOGEN UR QL STRIP.AUTO: 0.2 EU/DL (ref 0.2–1)
WBC # BLD AUTO: 12.2 K/UL (ref 4.3–11.1)
WBC URNS QL MICRO: NORMAL /HPF
YEAST URNS QL MICRO: NORMAL

## 2023-05-24 PROCEDURE — 85025 COMPLETE CBC W/AUTO DIFF WBC: CPT

## 2023-05-24 PROCEDURE — 99284 EMERGENCY DEPT VISIT MOD MDM: CPT

## 2023-05-24 PROCEDURE — 74176 CT ABD & PELVIS W/O CONTRAST: CPT

## 2023-05-24 PROCEDURE — 96374 THER/PROPH/DIAG INJ IV PUSH: CPT

## 2023-05-24 PROCEDURE — 81001 URINALYSIS AUTO W/SCOPE: CPT

## 2023-05-24 PROCEDURE — 80053 COMPREHEN METABOLIC PANEL: CPT

## 2023-05-24 PROCEDURE — 6360000002 HC RX W HCPCS: Performed by: EMERGENCY MEDICINE

## 2023-05-24 PROCEDURE — 83690 ASSAY OF LIPASE: CPT

## 2023-05-24 PROCEDURE — 81025 URINE PREGNANCY TEST: CPT

## 2023-05-24 RX ORDER — IBUPROFEN 800 MG/1
800 TABLET ORAL EVERY 8 HOURS PRN
Qty: 20 TABLET | Refills: 0 | Status: SHIPPED | OUTPATIENT
Start: 2023-05-24

## 2023-05-24 RX ORDER — KETOROLAC TROMETHAMINE 15 MG/ML
15 INJECTION, SOLUTION INTRAMUSCULAR; INTRAVENOUS
Status: COMPLETED | OUTPATIENT
Start: 2023-05-24 | End: 2023-05-24

## 2023-05-24 RX ADMIN — KETOROLAC TROMETHAMINE 15 MG: 15 INJECTION, SOLUTION INTRAMUSCULAR; INTRAVENOUS at 03:45

## 2023-05-24 ASSESSMENT — PAIN DESCRIPTION - LOCATION
LOCATION: ABDOMEN;FLANK

## 2023-05-24 ASSESSMENT — ENCOUNTER SYMPTOMS
ABDOMINAL PAIN: 1
CONSTIPATION: 0
NAUSEA: 0
DIARRHEA: 0
VOMITING: 0

## 2023-05-24 ASSESSMENT — PAIN - FUNCTIONAL ASSESSMENT
PAIN_FUNCTIONAL_ASSESSMENT: 0-10
PAIN_FUNCTIONAL_ASSESSMENT: 0-10

## 2023-05-24 ASSESSMENT — PAIN DESCRIPTION - ORIENTATION
ORIENTATION: LEFT

## 2023-05-24 ASSESSMENT — PAIN SCALES - GENERAL
PAINLEVEL_OUTOF10: 7
PAINLEVEL_OUTOF10: 4
PAINLEVEL_OUTOF10: 4
PAINLEVEL_OUTOF10: 7

## 2023-05-24 ASSESSMENT — LIFESTYLE VARIABLES
HOW OFTEN DO YOU HAVE A DRINK CONTAINING ALCOHOL: 2-4 TIMES A MONTH
HOW MANY STANDARD DRINKS CONTAINING ALCOHOL DO YOU HAVE ON A TYPICAL DAY: 10 OR MORE

## 2023-05-24 ASSESSMENT — PAIN DESCRIPTION - DESCRIPTORS: DESCRIPTORS: DULL;ACHING

## 2023-05-24 NOTE — ED NOTES
I have reviewed discharge instructions with the patient. The patient verbalized understanding. Patient left ED via Discharge Method: ambulatory to Home with boyfriend and son. Opportunity for questions and clarification provided. Patient given 1 scripts. Work note provided by MD    To continue your aftercare when you leave the hospital, you may receive an automated call from our care team to check in on how you are doing. This is a free service and part of our promise to provide the best care and service to meet your aftercare needs.  If you have questions, or wish to unsubscribe from this service please call [phone]. Thank you for Choosing our [facility] Emergency Department.        Meredith Edwards RN  05/24/23 0585

## 2023-05-24 NOTE — ED TRIAGE NOTES
Arrives with steady gait to room 9. Reports left flank pain radiating around to left side abdomen. Onset yesterday. States constant since onset and describes as \"nagging\". Denies n/v/d, fever/chills, urinary symptoms, vaginal bleeding/discharge. Hx DM, noncompliant with glucometer and medications. States pain worsens when lying down, some relief with sitting up. Attempted tylenol with some relief. Denies hx kidney stones.

## 2023-05-24 NOTE — ED PROVIDER NOTES
capsule omeprazole 40 mg capsule,delayed release   Take 1 capsule every day by oral route. Historical Med      lisinopril (PRINIVIL;ZESTRIL) 5 MG tablet Take 5 mg by mouthHistorical Med      escitalopram (LEXAPRO) 20 MG tablet escitalopram 20 mg tablet   TAKE 1 TABLET BY MOUTH EVERY DAYHistorical Med      ergocalciferol (ERGOCALCIFEROL) 1.25 MG (30988 UT) capsule TAKE 1 TABLET BY MOUTH THREE TIMES A MONTHHistorical Med      metFORMIN (GLUCOPHAGE) 500 MG tablet Take 1 tablet by mouth 2 times daily (with meals), Disp-180 tablet, R-1Print      amLODIPine (NORVASC) 5 MG tablet Take 5 mg by mouth dailyHistorical Med      LORazepam (ATIVAN) 1 MG tablet Take 1 mg by mouth every 8 hours as needed. Historical Med                Results Reviewed:      Recent Results (from the past 24 hour(s))   Urinalysis w rflx microscopic    Collection Time: 05/24/23  3:19 AM   Result Value Ref Range    Color, UA YELLOW      Appearance SLIGHTLY CLOUDY      Specific Gravity, UA 1.025 (H) 1.001 - 1.023      pH, Urine 6.0 5.0 - 9.0      Protein, UA TRACE (A) NEG mg/dL    Glucose, UA Negative mg/dL    Ketones, Urine Negative NEG mg/dL    Bilirubin Urine Negative NEG      Blood, Urine Trace Intact (A) NEG      Urobilinogen, Urine 0.2 0.2 - 1.0 EU/dL    Nitrite, Urine Negative NEG      Leukocyte Esterase, Urine SMALL (A) NEG     Urinalysis, Micro    Collection Time: 05/24/23  3:19 AM   Result Value Ref Range    WBC, UA 5-10 0 /hpf    RBC, UA 0-3 0 /hpf    Epithelial Cells UA 5-10 0 /hpf    BACTERIA, URINE TRACE 0 /hpf    Casts 0 0 /lpf    Crystals 0 0 /LPF    Mucus, UA TRACE 0 /lpf    Yeast, UA OCCASIONAL      Other observations RESULTS VERIFIED MANUALLY     CBC with Auto Differential    Collection Time: 05/24/23  3:20 AM   Result Value Ref Range    WBC 12.2 (H) 4.3 - 11.1 K/uL    RBC 4.19 4.05 - 5.20 M/uL    Hemoglobin 12.6 11.7 - 15.4 g/dL    Hematocrit 37.7 35.8 - 46.3 %    MCV 90.0 82.0 - 102.0 FL    MCH 30.1 26.1 - 32.9 PG    MCHC 33.4 31.4 -

## 2024-05-02 ENCOUNTER — HOSPITAL ENCOUNTER (EMERGENCY)
Age: 39
Discharge: HOME | End: 2024-05-02
Payer: MEDICAID

## 2024-05-02 ENCOUNTER — APPOINTMENT (OUTPATIENT)
Dept: GENERAL RADIOLOGY | Age: 39
End: 2024-05-02
Payer: MEDICAID

## 2024-05-02 VITALS
RESPIRATION RATE: 18 BRPM | BODY MASS INDEX: 47.09 KG/M2 | DIASTOLIC BLOOD PRESSURE: 91 MMHG | HEIGHT: 66 IN | WEIGHT: 293 LBS | OXYGEN SATURATION: 98 % | HEART RATE: 88 BPM | SYSTOLIC BLOOD PRESSURE: 142 MMHG | TEMPERATURE: 98.6 F

## 2024-05-02 DIAGNOSIS — R03.0 ELEVATED BLOOD PRESSURE READING: ICD-10-CM

## 2024-05-02 DIAGNOSIS — M79.672 LEFT FOOT PAIN: Primary | ICD-10-CM

## 2024-05-02 PROCEDURE — 6370000000 HC RX 637 (ALT 250 FOR IP): Performed by: NURSE PRACTITIONER

## 2024-05-02 PROCEDURE — 99283 EMERGENCY DEPT VISIT LOW MDM: CPT

## 2024-05-02 PROCEDURE — 73630 X-RAY EXAM OF FOOT: CPT

## 2024-05-02 RX ORDER — GABAPENTIN 300 MG/1
300 CAPSULE ORAL 2 TIMES DAILY
COMMUNITY

## 2024-05-02 RX ORDER — NAPROXEN 250 MG/1
250 TABLET ORAL 2 TIMES DAILY WITH MEALS
Qty: 8 TABLET | Refills: 0 | Status: SHIPPED | OUTPATIENT
Start: 2024-05-02 | End: 2024-05-06

## 2024-05-02 RX ORDER — GLIPIZIDE 5 MG/1
5 TABLET, FILM COATED, EXTENDED RELEASE ORAL 2 TIMES DAILY
COMMUNITY
Start: 2024-02-18

## 2024-05-02 RX ORDER — NAPROXEN 250 MG/1
500 TABLET ORAL
Status: COMPLETED | OUTPATIENT
Start: 2024-05-02 | End: 2024-05-02

## 2024-05-02 RX ADMIN — NAPROXEN 500 MG: 250 TABLET ORAL at 21:13

## 2024-05-02 ASSESSMENT — PAIN DESCRIPTION - LOCATION: LOCATION: FOOT

## 2024-05-02 ASSESSMENT — PAIN SCALES - GENERAL: PAINLEVEL_OUTOF10: 8

## 2024-05-02 ASSESSMENT — PAIN DESCRIPTION - ORIENTATION: ORIENTATION: LEFT

## 2024-05-02 ASSESSMENT — PAIN - FUNCTIONAL ASSESSMENT: PAIN_FUNCTIONAL_ASSESSMENT: 0-10

## 2024-05-02 NOTE — ED TRIAGE NOTES
Pt states she dropped phone on foot 2 weeks ago and since then has had pain and swelling in foot. Pain with ambulation. Has not had XR yet.

## 2024-05-03 NOTE — ED PROVIDER NOTES
Emergency Department Provider Note       PCP: Pj Hinojosa MD   Age: 38 y.o.   Sex: female     DISPOSITION Decision To Discharge 05/02/2024 09:20:49 PM       ICD-10-CM    1. Left foot pain  M79.672 DELMI - Agustin Ellison DPM, [facility], [place]      2. Elevated blood pressure reading  R03.0           Medical Decision Making     As in HPI.  Patient with foot pain after dropping her phone on her foot 2 weeks ago.  Weightbearing and ambulatory history of DM hypertension.  BP elevated in the ED as well as a bit tachycardic but I suspect this related to pain and ambulation from the parking lot into triage room.  Her vitals improved in the ED.  Given NSAID.  She has a reassuring exam and is neurovascularly intact.  Radiograph with no acute process.  Low clinical suspicion at this time for Lisfranc injury fracture dislocation, Achilles injury, DVT sepsis cellulitis, osteomyelitis or other acute emergent process.  Low suspicion for hypertensive urgency or emergency or other emergent process.  Will place in postop shoe for support and refer to podiatry.  Given strict turn precautions  Patient is well-hydrated appearing, no distress.  Nontoxic-appearing, tolerating oral intake, hemodynamically stable. All findings and plan were discussed with the patient. All questions answered. Discussed with the patient that an unremarkable evaluation in the ED does not preclude the development or presence of a serious or life threatening condition. Patient was instructed to return immediately for any worsening or change in current symptoms, or if symptoms do not continue to improve. I instructed them to follow up with their primary care provider, own specialist, or medical provider that I am recommending for him within the next 2-3 days  The patient acknowledged understanding plan of care and affirmed approval.     Signed by: SALLY Clark     This note created using Dragon voice recognition software.

## 2024-10-03 ENCOUNTER — HOSPITAL ENCOUNTER (EMERGENCY)
Age: 39
Discharge: HOME | End: 2024-10-03
Attending: EMERGENCY MEDICINE
Payer: MEDICAID

## 2024-10-03 ENCOUNTER — APPOINTMENT (OUTPATIENT)
Dept: GENERAL RADIOLOGY | Age: 39
End: 2024-10-03
Payer: MEDICAID

## 2024-10-03 VITALS
RESPIRATION RATE: 24 BRPM | HEART RATE: 97 BPM | OXYGEN SATURATION: 96 % | WEIGHT: 293 LBS | SYSTOLIC BLOOD PRESSURE: 132 MMHG | BODY MASS INDEX: 47.09 KG/M2 | HEIGHT: 66 IN | DIASTOLIC BLOOD PRESSURE: 72 MMHG | TEMPERATURE: 100.9 F

## 2024-10-03 DIAGNOSIS — J45.21 MILD INTERMITTENT REACTIVE AIRWAY DISEASE WITH ACUTE EXACERBATION: ICD-10-CM

## 2024-10-03 DIAGNOSIS — J06.9 ACUTE UPPER RESPIRATORY INFECTION: Primary | ICD-10-CM

## 2024-10-03 DIAGNOSIS — R05.1 ACUTE COUGH: ICD-10-CM

## 2024-10-03 LAB
ANION GAP SERPL CALC-SCNC: 12 MMOL/L (ref 9–18)
APPEARANCE UR: ABNORMAL
BACTERIA URNS QL MICRO: NORMAL /HPF
BASOPHILS # BLD: 0 K/UL (ref 0–0.2)
BASOPHILS NFR BLD: 0 % (ref 0–2)
BILIRUB UR QL: NEGATIVE
BUN SERPL-MCNC: 10 MG/DL (ref 6–23)
CALCIUM SERPL-MCNC: 8.9 MG/DL (ref 8.3–10.4)
CHLORIDE SERPL-SCNC: 103 MMOL/L (ref 98–107)
CO2 SERPL-SCNC: 24 MMOL/L (ref 21–32)
COLOR UR: YELLOW
CREAT SERPL-MCNC: 0.71 MG/DL (ref 0.6–1)
DIFFERENTIAL METHOD BLD: ABNORMAL
EOSINOPHIL # BLD: 0 K/UL (ref 0–0.8)
EOSINOPHIL NFR BLD: 1 % (ref 0.5–7.8)
EPI CELLS #/AREA URNS HPF: NORMAL /HPF
ERYTHROCYTE [DISTWIDTH] IN BLOOD BY AUTOMATED COUNT: 13.7 % (ref 11.9–14.6)
FLUAV RNA SPEC QL NAA+PROBE: NOT DETECTED
FLUBV RNA SPEC QL NAA+PROBE: NOT DETECTED
GLUCOSE SERPL-MCNC: 130 MG/DL (ref 65–100)
GLUCOSE UR STRIP.AUTO-MCNC: NEGATIVE MG/DL
HCT VFR BLD AUTO: 39.4 % (ref 35.8–46.3)
HGB BLD-MCNC: 13.1 G/DL (ref 11.7–15.4)
HGB UR QL STRIP: NEGATIVE
IMM GRANULOCYTES # BLD AUTO: 0 K/UL (ref 0–0.5)
IMM GRANULOCYTES NFR BLD AUTO: 0 % (ref 0–5)
KETONES UR QL STRIP.AUTO: NEGATIVE MG/DL
LACTATE SERPL-SCNC: 1.3 MMOL/L (ref 0.5–2)
LEUKOCYTE ESTERASE UR QL STRIP.AUTO: NEGATIVE
LYMPHOCYTES # BLD: 2 K/UL (ref 0.5–4.6)
LYMPHOCYTES NFR BLD: 41 % (ref 13–44)
MCH RBC QN AUTO: 30.5 PG (ref 26.1–32.9)
MCHC RBC AUTO-ENTMCNC: 33.2 G/DL (ref 31.4–35)
MCV RBC AUTO: 91.6 FL (ref 82–102)
MONOCYTES # BLD: 0.8 K/UL (ref 0.1–1.3)
MONOCYTES NFR BLD: 16 % (ref 4–12)
MUCOUS THREADS URNS QL MICRO: NORMAL /LPF
NEUTS SEG # BLD: 2.1 K/UL (ref 1.7–8.2)
NEUTS SEG NFR BLD: 42 % (ref 43–78)
NITRITE UR QL STRIP.AUTO: NEGATIVE
NRBC # BLD: 0 K/UL (ref 0–0.2)
OTHER OBSERVATIONS: NORMAL
PH UR STRIP: 6 (ref 5–9)
PLATELET # BLD AUTO: 217 K/UL (ref 150–450)
PMV BLD AUTO: 10.3 FL (ref 9.4–12.3)
POTASSIUM SERPL-SCNC: 3.5 MMOL/L (ref 3.5–5.1)
PROCALCITONIN SERPL-MCNC: 0.04 NG/ML (ref 0–0.49)
PROT UR STRIP-MCNC: 30 MG/DL
RBC # BLD AUTO: 4.3 M/UL (ref 4.05–5.2)
RBC #/AREA URNS HPF: 0 /HPF
SARS-COV-2 RDRP RESP QL NAA+PROBE: NOT DETECTED
SODIUM SERPL-SCNC: 139 MMOL/L (ref 133–143)
SOURCE: NORMAL
SP GR UR REFRACTOMETRY: >=1.03 (ref 1–1.02)
UROBILINOGEN UR QL STRIP.AUTO: 0.2 EU/DL (ref 0.2–1)
WBC # BLD AUTO: 5 K/UL (ref 4.3–11.1)
WBC URNS QL MICRO: NORMAL /HPF

## 2024-10-03 PROCEDURE — 87635 SARS-COV-2 COVID-19 AMP PRB: CPT

## 2024-10-03 PROCEDURE — 85025 COMPLETE CBC W/AUTO DIFF WBC: CPT

## 2024-10-03 PROCEDURE — 80048 BASIC METABOLIC PNL TOTAL CA: CPT

## 2024-10-03 PROCEDURE — 6370000000 HC RX 637 (ALT 250 FOR IP): Performed by: EMERGENCY MEDICINE

## 2024-10-03 PROCEDURE — 96374 THER/PROPH/DIAG INJ IV PUSH: CPT

## 2024-10-03 PROCEDURE — 81001 URINALYSIS AUTO W/SCOPE: CPT

## 2024-10-03 PROCEDURE — 96361 HYDRATE IV INFUSION ADD-ON: CPT

## 2024-10-03 PROCEDURE — 87502 INFLUENZA DNA AMP PROBE: CPT

## 2024-10-03 PROCEDURE — 84145 PROCALCITONIN (PCT): CPT

## 2024-10-03 PROCEDURE — 71046 X-RAY EXAM CHEST 2 VIEWS: CPT

## 2024-10-03 PROCEDURE — 83605 ASSAY OF LACTIC ACID: CPT

## 2024-10-03 PROCEDURE — 2580000003 HC RX 258: Performed by: EMERGENCY MEDICINE

## 2024-10-03 PROCEDURE — 99284 EMERGENCY DEPT VISIT MOD MDM: CPT

## 2024-10-03 PROCEDURE — 6360000002 HC RX W HCPCS: Performed by: EMERGENCY MEDICINE

## 2024-10-03 RX ORDER — KETOROLAC TROMETHAMINE 30 MG/ML
30 INJECTION, SOLUTION INTRAMUSCULAR; INTRAVENOUS
Status: COMPLETED | OUTPATIENT
Start: 2024-10-03 | End: 2024-10-03

## 2024-10-03 RX ORDER — ACETAMINOPHEN 500 MG
1000 TABLET ORAL
Status: COMPLETED | OUTPATIENT
Start: 2024-10-03 | End: 2024-10-03

## 2024-10-03 RX ORDER — BENZONATATE 100 MG/1
100 CAPSULE ORAL 3 TIMES DAILY PRN
Qty: 20 CAPSULE | Refills: 0 | Status: SHIPPED | OUTPATIENT
Start: 2024-10-03 | End: 2024-10-13

## 2024-10-03 RX ORDER — 0.9 % SODIUM CHLORIDE 0.9 %
1000 INTRAVENOUS SOLUTION INTRAVENOUS ONCE
Status: COMPLETED | OUTPATIENT
Start: 2024-10-03 | End: 2024-10-03

## 2024-10-03 RX ORDER — PREDNISONE 20 MG/1
20 TABLET ORAL DAILY
Qty: 5 TABLET | Refills: 0 | Status: SHIPPED | OUTPATIENT
Start: 2024-10-03 | End: 2024-10-08

## 2024-10-03 RX ORDER — IPRATROPIUM BROMIDE AND ALBUTEROL SULFATE 2.5; .5 MG/3ML; MG/3ML
1 SOLUTION RESPIRATORY (INHALATION)
Status: COMPLETED | OUTPATIENT
Start: 2024-10-03 | End: 2024-10-03

## 2024-10-03 RX ADMIN — SODIUM CHLORIDE 1000 ML: 9 INJECTION, SOLUTION INTRAVENOUS at 21:33

## 2024-10-03 RX ADMIN — ACETAMINOPHEN 1000 MG: 500 TABLET ORAL at 20:37

## 2024-10-03 RX ADMIN — IPRATROPIUM BROMIDE AND ALBUTEROL SULFATE 1 DOSE: 2.5; .5 SOLUTION RESPIRATORY (INHALATION) at 21:49

## 2024-10-03 RX ADMIN — KETOROLAC TROMETHAMINE 30 MG: 30 INJECTION, SOLUTION INTRAMUSCULAR at 21:49

## 2024-10-03 ASSESSMENT — PAIN DESCRIPTION - DESCRIPTORS: DESCRIPTORS: SORE

## 2024-10-03 ASSESSMENT — PAIN DESCRIPTION - LOCATION: LOCATION: RIB CAGE

## 2024-10-03 ASSESSMENT — PAIN - FUNCTIONAL ASSESSMENT: PAIN_FUNCTIONAL_ASSESSMENT: 0-10

## 2024-10-03 ASSESSMENT — LIFESTYLE VARIABLES
HOW OFTEN DO YOU HAVE A DRINK CONTAINING ALCOHOL: 2-4 TIMES A MONTH
HOW MANY STANDARD DRINKS CONTAINING ALCOHOL DO YOU HAVE ON A TYPICAL DAY: 5 OR 6

## 2024-10-03 ASSESSMENT — PAIN SCALES - GENERAL
PAINLEVEL_OUTOF10: 7
PAINLEVEL_OUTOF10: 10
PAINLEVEL_OUTOF10: 10

## 2024-10-03 NOTE — ED TRIAGE NOTES
Patient presents ambulatory to triage in mild distress. Pt sts she was around her sick kids all weekend and started feeling bad on Monday with cough and chills. Pt seen by PCP  on Tuesday and tested for Flu and Covid, both negative. Pt has continued to have cough and sts she hurt now mainly from coughing so much. Patient becomes short of breath with ambulation.

## 2024-10-04 NOTE — ED PROVIDER NOTES
Lymphocytes Absolute 2.0 0.5 - 4.6 K/UL    Monocytes Absolute 0.8 0.1 - 1.3 K/UL    Eosinophils Absolute 0.0 0.0 - 0.8 K/UL    Basophils Absolute 0.0 0.0 - 0.2 K/UL    Immature Granulocytes Absolute 0.0 0.0 - 0.5 K/UL   BMP   Result Value Ref Range    Sodium 139 133 - 143 mmol/L    Potassium 3.5 3.5 - 5.1 mmol/L    Chloride 103 98 - 107 mmol/L    CO2 24 21 - 32 mmol/L    Anion Gap 12 9 - 18 mmol/L    Glucose 130 (H) 65 - 100 mg/dL    BUN 10 6 - 23 MG/DL    Creatinine 0.71 0.6 - 1.0 MG/DL    Est, Glom Filt Rate >90 >60 ml/min/1.73m2    Calcium 8.9 8.3 - 10.4 MG/DL   Procalcitonin   Result Value Ref Range    Procalcitonin 0.04 0.00 - 0.49 ng/mL   Lactate, Sepsis   Result Value Ref Range    Lactic Acid, Sepsis 1.3 0.5 - 2.0 MMOL/L   Urinalysis w rflx microscopic   Result Value Ref Range    Color, UA YELLOW      Appearance CLOUDY      Specific Gravity, UA >=1.030 1.001 - 1.023    pH, Urine 6.0 5.0 - 9.0      Protein, UA 30 (A) NEG mg/dL    Glucose, Ur Negative NEG mg/dL    Ketones, Urine Negative NEG mg/dL    Bilirubin, Urine Negative NEG      Blood, Urine Negative NEG      Urobilinogen, Urine 0.2 0.2 - 1.0 EU/dL    Nitrite, Urine Negative NEG      Leukocyte Esterase, Urine Negative NEG     Urinalysis, Micro   Result Value Ref Range    WBC, UA 0-3 0 /hpf    RBC, UA 0 0 /hpf    Epithelial Cells, UA 10-20 0 /hpf    BACTERIA, URINE TRACE 0 /hpf    Mucus, UA TRACE 0 /lpf    Other observations RESULTS VERIFIED MANUALLY           XR CHEST (2 VW)   Final Result   No acute findings. No focal consolidation or atelectasis.            Report signed on 10/03/2024 (21:17 Eastern Time)   Signed by: Rito Maher M.D.   Reading Location: [address] [place] [address]     10/03/24  2126   COVID19 Not detected        Voice dictation software was used during the making of this note.  This software is not perfect and grammatical and other typographical errors may be present.  This note has not been completely proofread for

## 2024-11-11 ENCOUNTER — HOSPITAL ENCOUNTER (OUTPATIENT)
Dept: GENERAL RADIOLOGY | Age: 39
Discharge: HOME | End: 2024-11-14
Payer: MEDICAID

## 2024-11-11 DIAGNOSIS — M25.561 KNEE PAIN, RIGHT ANTERIOR: ICD-10-CM

## 2024-11-11 PROCEDURE — 73562 X-RAY EXAM OF KNEE 3: CPT

## 2024-12-30 ENCOUNTER — HOSPITAL ENCOUNTER (EMERGENCY)
Age: 39
Discharge: HOME | End: 2024-12-30
Attending: EMERGENCY MEDICINE
Payer: MEDICAID

## 2024-12-30 ENCOUNTER — APPOINTMENT (OUTPATIENT)
Dept: GENERAL RADIOLOGY | Age: 39
End: 2024-12-30
Payer: MEDICAID

## 2024-12-30 VITALS
BODY MASS INDEX: 47.09 KG/M2 | HEART RATE: 112 BPM | SYSTOLIC BLOOD PRESSURE: 146 MMHG | TEMPERATURE: 99.9 F | DIASTOLIC BLOOD PRESSURE: 94 MMHG | HEIGHT: 66 IN | OXYGEN SATURATION: 95 % | WEIGHT: 293 LBS | RESPIRATION RATE: 24 BRPM

## 2024-12-30 DIAGNOSIS — R50.9 ACUTE FEBRILE ILLNESS: ICD-10-CM

## 2024-12-30 DIAGNOSIS — J06.9 ACUTE UPPER RESPIRATORY INFECTION: Primary | ICD-10-CM

## 2024-12-30 LAB
FLUAV RNA SPEC QL NAA+PROBE: NOT DETECTED
FLUBV RNA SPEC QL NAA+PROBE: NOT DETECTED
SARS-COV-2 RDRP RESP QL NAA+PROBE: NOT DETECTED
SOURCE: NORMAL

## 2024-12-30 PROCEDURE — 71046 X-RAY EXAM CHEST 2 VIEWS: CPT

## 2024-12-30 PROCEDURE — 99284 EMERGENCY DEPT VISIT MOD MDM: CPT

## 2024-12-30 PROCEDURE — 87635 SARS-COV-2 COVID-19 AMP PRB: CPT

## 2024-12-30 PROCEDURE — 6370000000 HC RX 637 (ALT 250 FOR IP): Performed by: EMERGENCY MEDICINE

## 2024-12-30 PROCEDURE — 87502 INFLUENZA DNA AMP PROBE: CPT

## 2024-12-30 RX ORDER — GUAIFENESIN 200 MG/10ML
200 LIQUID ORAL 3 TIMES DAILY PRN
Qty: 120 ML | Refills: 0 | Status: SHIPPED | OUTPATIENT
Start: 2024-12-30

## 2024-12-30 RX ORDER — ACETAMINOPHEN 500 MG
1000 TABLET ORAL
Status: COMPLETED | OUTPATIENT
Start: 2024-12-30 | End: 2024-12-30

## 2024-12-30 RX ORDER — ALBUTEROL SULFATE 90 UG/1
2 INHALANT RESPIRATORY (INHALATION) 4 TIMES DAILY PRN
Qty: 54 G | Refills: 1 | Status: SHIPPED | OUTPATIENT
Start: 2024-12-30

## 2024-12-30 RX ORDER — LORATADINE 10 MG/1
10 TABLET ORAL DAILY
Qty: 10 TABLET | Refills: 0 | Status: SHIPPED | OUTPATIENT
Start: 2024-12-30 | End: 2025-01-29

## 2024-12-30 RX ADMIN — ACETAMINOPHEN 1000 MG: 500 TABLET ORAL at 07:07

## 2024-12-30 ASSESSMENT — PAIN SCALES - GENERAL
PAINLEVEL_OUTOF10: 10
PAINLEVEL_OUTOF10: 9
PAINLEVEL_OUTOF10: 8

## 2024-12-30 ASSESSMENT — PAIN - FUNCTIONAL ASSESSMENT: PAIN_FUNCTIONAL_ASSESSMENT: 0-10

## 2024-12-30 ASSESSMENT — ENCOUNTER SYMPTOMS
WHEEZING: 0
ABDOMINAL PAIN: 0
PHOTOPHOBIA: 0
EYE REDNESS: 0
ANAL BLEEDING: 0
SORE THROAT: 1
COLOR CHANGE: 0
COUGH: 1
FLU SYMPTOMS: 1

## 2024-12-30 NOTE — DISCHARGE INSTRUCTIONS
Remember to alternate Tylenol 1000 mg or ibuprofen 800 mg every 4-6 hours as needed for fever or body aches  Drink plenty of fluid  Take medications as prescribed  Follow-up with your primary care provider  Return to the ER for any new, worsening or life-threatening symptoms

## 2024-12-30 NOTE — ED TRIAGE NOTES
Pt came into ED c/o cough, fever, body ache, headache, shortness of breath on exertion since Friday.

## 2024-12-30 NOTE — ED PROVIDER NOTES
Needs (3/6/2024)    Received from [email or web address]    Transportation Needs     Lack of Transportation: No   Physical Activity: Inactive (3/6/2024)    Received from [email or web address]    Physical Activity     Days of Exercise per Week: 0     Minutes of Exercise per Session: 0     Total Minutes of Exercise per Week: 0   Stress: No Stress Concern Present (3/6/2024)    Received from [email or web address]    Stress     Feeling of Stress : Only a little   Social Connections: Socially Integrated (3/6/2024)    Received from [email or web address]    Social Connections     Frequency of Communication with Friends and Family: More than three times a week     Frequency of Social Gatherings with Friends and Family: More than three times a week   Intimate Partner Violence: Not At Risk (3/6/2024)    Received from [email or web address]    Intimate Partner Violence     Fear of Current or Ex-Partner: No     Emotionally Abused: No     Physically Abused: No     Sexually Abused: No   Housing Stability: Not At Risk (3/6/2024)    Received from [email or web address]    Housing Stability     Was there a time when you did not have a steady place to sleep: No     Worried that the place you are staying is making you sick: No        Previous Medications    AMLODIPINE (NORVASC) 5 MG TABLET    Take 5 mg by mouth daily    ERGOCALCIFEROL (ERGOCALCIFEROL) 1.25 MG (25853 UT) CAPSULE    TAKE 1 TABLET BY MOUTH THREE TIMES A MONTH    ESCITALOPRAM (LEXAPRO) 20 MG TABLET    escitalopram 20 mg tablet   TAKE 1 TABLET BY MOUTH EVERY DAY    GABAPENTIN (NEURONTIN) 300 MG CAPSULE    Take 1 capsule by mouth 2 times daily.    GLIPIZIDE (GLUCOTROL XL) 5 MG EXTENDED RELEASE TABLET    Take 1 tablet by mouth 2 times daily    IBUPROFEN (ADVIL;MOTRIN) 800 MG TABLET    Take 1 tablet by mouth every 8 hours as needed for Pain    LISINOPRIL (PRINIVIL;ZESTRIL) 5 MG TABLET    Take 5 mg by mouth    LORAZEPAM (ATIVAN) 1 MG  Rhomboid Transposition Flap Text: The defect edges were debeveled with a #15 scalpel blade.  Given the location of the defect and the proximity to free margins a rhomboid transposition flap was deemed most appropriate.  Using a sterile surgical marker, an appropriate rhomboid flap was drawn incorporating the defect.    The area thus outlined was incised deep to adipose tissue with a #15 scalpel blade.  The skin margins were undermined to an appropriate distance in all directions utilizing iris scissors.

## 2025-03-03 ENCOUNTER — TRANSCRIBE ORDERS (OUTPATIENT)
Dept: SCHEDULING | Age: 40
End: 2025-03-03

## 2025-03-03 DIAGNOSIS — Z12.31 OTHER SCREENING MAMMOGRAM: Primary | ICD-10-CM

## 2025-06-02 ENCOUNTER — HOSPITAL ENCOUNTER (EMERGENCY)
Age: 40
Discharge: HOME | End: 2025-06-02
Attending: EMERGENCY MEDICINE
Payer: MEDICAID

## 2025-06-02 VITALS
RESPIRATION RATE: 21 BRPM | DIASTOLIC BLOOD PRESSURE: 100 MMHG | SYSTOLIC BLOOD PRESSURE: 153 MMHG | HEART RATE: 77 BPM | TEMPERATURE: 98 F | HEIGHT: 66 IN | WEIGHT: 293 LBS | OXYGEN SATURATION: 96 % | BODY MASS INDEX: 47.09 KG/M2

## 2025-06-02 DIAGNOSIS — R07.89 CHEST WALL PAIN: ICD-10-CM

## 2025-06-02 DIAGNOSIS — M54.2 NECK PAIN: Primary | ICD-10-CM

## 2025-06-02 LAB
ALBUMIN SERPL-MCNC: 3.8 G/DL (ref 3.5–5)
ALBUMIN/GLOB SERPL: 1.2 (ref 1–1.9)
ALP SERPL-CCNC: 119 U/L (ref 35–104)
ALT SERPL-CCNC: 31 U/L (ref 12–65)
ANION GAP SERPL CALC-SCNC: 11 MMOL/L (ref 7–16)
AST SERPL-CCNC: 28 U/L (ref 15–37)
BASOPHILS # BLD: 0.03 K/UL (ref 0–0.2)
BASOPHILS NFR BLD: 0.3 % (ref 0–2)
BILIRUB SERPL-MCNC: 0.2 MG/DL (ref 0–1.2)
BUN SERPL-MCNC: 12 MG/DL (ref 6–23)
CALCIUM SERPL-MCNC: 9.3 MG/DL (ref 8.8–10.2)
CHLORIDE SERPL-SCNC: 99 MMOL/L (ref 98–107)
CO2 SERPL-SCNC: 24 MMOL/L (ref 20–29)
CREAT SERPL-MCNC: 0.64 MG/DL (ref 0.8–1.3)
DIFFERENTIAL METHOD BLD: NORMAL
EKG ATRIAL RATE: 80 BPM
EKG DIAGNOSIS: NORMAL
EKG P AXIS: 50 DEGREES
EKG P-R INTERVAL: 135 MS
EKG Q-T INTERVAL: 414 MS
EKG QRS DURATION: 93 MS
EKG QTC CALCULATION (BAZETT): 478 MS
EKG R AXIS: -11 DEGREES
EKG T AXIS: 44 DEGREES
EKG VENTRICULAR RATE: 80 BPM
EOSINOPHIL # BLD: 0.15 K/UL (ref 0–0.8)
EOSINOPHIL NFR BLD: 1.7 % (ref 0.5–7.8)
ERYTHROCYTE [DISTWIDTH] IN BLOOD BY AUTOMATED COUNT: 12.3 % (ref 11.9–14.6)
GLOBULIN SER CALC-MCNC: 3.3 G/DL (ref 2.3–3.5)
GLUCOSE SERPL-MCNC: 339 MG/DL (ref 65–100)
HCT VFR BLD AUTO: 38.1 % (ref 35.8–46.3)
HGB BLD-MCNC: 13.3 G/DL (ref 11.7–15.4)
IMM GRANULOCYTES # BLD AUTO: 0.03 K/UL (ref 0–0.5)
IMM GRANULOCYTES NFR BLD AUTO: 0.3 % (ref 0–5)
LYMPHOCYTES # BLD: 3.32 K/UL (ref 0.5–4.6)
LYMPHOCYTES NFR BLD: 37.1 % (ref 13–44)
MCH RBC QN AUTO: 31 PG (ref 26.1–32.9)
MCHC RBC AUTO-ENTMCNC: 34.9 G/DL (ref 31.4–35)
MCV RBC AUTO: 88.8 FL (ref 82–102)
MONOCYTES # BLD: 0.66 K/UL (ref 0.1–1.3)
MONOCYTES NFR BLD: 7.4 % (ref 4–12)
NEUTS SEG # BLD: 4.77 K/UL (ref 1.7–8.2)
NEUTS SEG NFR BLD: 53.2 % (ref 43–78)
NRBC # BLD: 0 K/UL (ref 0–0.2)
PLATELET # BLD AUTO: 267 K/UL (ref 150–450)
PMV BLD AUTO: 11.1 FL (ref 9.4–12.3)
POTASSIUM SERPL-SCNC: 3.7 MMOL/L (ref 3.5–5.1)
PROT SERPL-MCNC: 7.1 G/DL (ref 6.3–8.2)
RBC # BLD AUTO: 4.29 M/UL (ref 4.05–5.2)
SODIUM SERPL-SCNC: 134 MMOL/L (ref 133–143)
TROPONIN T SERPL HS-MCNC: <6 NG/L (ref 0–14)
WBC # BLD AUTO: 9 K/UL (ref 4.3–11.1)

## 2025-06-02 PROCEDURE — 99284 EMERGENCY DEPT VISIT MOD MDM: CPT

## 2025-06-02 PROCEDURE — 6360000002 HC RX W HCPCS: Performed by: EMERGENCY MEDICINE

## 2025-06-02 PROCEDURE — 96375 TX/PRO/DX INJ NEW DRUG ADDON: CPT

## 2025-06-02 PROCEDURE — 80053 COMPREHEN METABOLIC PANEL: CPT

## 2025-06-02 PROCEDURE — 96374 THER/PROPH/DIAG INJ IV PUSH: CPT

## 2025-06-02 PROCEDURE — 85025 COMPLETE CBC W/AUTO DIFF WBC: CPT

## 2025-06-02 PROCEDURE — 93010 ELECTROCARDIOGRAM REPORT: CPT | Performed by: INTERNAL MEDICINE

## 2025-06-02 PROCEDURE — 93005 ELECTROCARDIOGRAM TRACING: CPT | Performed by: EMERGENCY MEDICINE

## 2025-06-02 PROCEDURE — 84484 ASSAY OF TROPONIN QUANT: CPT

## 2025-06-02 PROCEDURE — 6370000000 HC RX 637 (ALT 250 FOR IP): Performed by: EMERGENCY MEDICINE

## 2025-06-02 RX ORDER — METHOCARBAMOL 750 MG/1
750 TABLET, FILM COATED ORAL
Status: COMPLETED | OUTPATIENT
Start: 2025-06-02 | End: 2025-06-02

## 2025-06-02 RX ORDER — LABETALOL HYDROCHLORIDE 5 MG/ML
20 INJECTION, SOLUTION INTRAVENOUS
Status: COMPLETED | OUTPATIENT
Start: 2025-06-02 | End: 2025-06-02

## 2025-06-02 RX ORDER — KETOROLAC TROMETHAMINE 30 MG/ML
30 INJECTION, SOLUTION INTRAMUSCULAR; INTRAVENOUS
Status: COMPLETED | OUTPATIENT
Start: 2025-06-02 | End: 2025-06-02

## 2025-06-02 RX ORDER — ATORVASTATIN CALCIUM 10 MG/1
10 TABLET, FILM COATED ORAL
COMMUNITY

## 2025-06-02 RX ORDER — IBUPROFEN 800 MG/1
800 TABLET, FILM COATED ORAL EVERY 8 HOURS PRN
Qty: 21 TABLET | Refills: 0 | Status: SHIPPED | OUTPATIENT
Start: 2025-06-02

## 2025-06-02 RX ORDER — TRAMADOL HYDROCHLORIDE 50 MG/1
50-100 TABLET ORAL EVERY 8 HOURS PRN
Qty: 12 TABLET | Refills: 0 | Status: SHIPPED | OUTPATIENT
Start: 2025-06-02 | End: 2025-06-05

## 2025-06-02 RX ORDER — LORATADINE 10 MG/1
10 TABLET ORAL DAILY
COMMUNITY

## 2025-06-02 RX ORDER — PREDNISONE 20 MG/1
40 TABLET ORAL DAILY
Qty: 6 TABLET | Refills: 0 | Status: SHIPPED | OUTPATIENT
Start: 2025-06-02 | End: 2025-06-05

## 2025-06-02 RX ORDER — METHOCARBAMOL 750 MG/1
750 TABLET, FILM COATED ORAL 3 TIMES DAILY PRN
Qty: 30 TABLET | Refills: 0 | Status: SHIPPED | OUTPATIENT
Start: 2025-06-02 | End: 2025-06-03

## 2025-06-02 RX ADMIN — LABETALOL HYDROCHLORIDE 20 MG: 5 INJECTION, SOLUTION INTRAVENOUS at 02:14

## 2025-06-02 RX ADMIN — PREDNISONE 60 MG: 50 TABLET ORAL at 02:15

## 2025-06-02 RX ADMIN — METHOCARBAMOL 750 MG: 750 TABLET ORAL at 02:16

## 2025-06-02 RX ADMIN — KETOROLAC TROMETHAMINE 30 MG: 30 INJECTION, SOLUTION INTRAMUSCULAR at 02:15

## 2025-06-02 ASSESSMENT — PAIN DESCRIPTION - LOCATION
LOCATION: NECK
LOCATION: NECK

## 2025-06-02 ASSESSMENT — ENCOUNTER SYMPTOMS
SHORTNESS OF BREATH: 0
COUGH: 0

## 2025-06-02 ASSESSMENT — PAIN SCALES - GENERAL
PAINLEVEL_OUTOF10: 6
PAINLEVEL_OUTOF10: 10
PAINLEVEL_OUTOF10: 7

## 2025-06-02 ASSESSMENT — LIFESTYLE VARIABLES
HOW MANY STANDARD DRINKS CONTAINING ALCOHOL DO YOU HAVE ON A TYPICAL DAY: 1 OR 2
HOW OFTEN DO YOU HAVE A DRINK CONTAINING ALCOHOL: 2-4 TIMES A MONTH

## 2025-06-02 ASSESSMENT — PAIN - FUNCTIONAL ASSESSMENT: PAIN_FUNCTIONAL_ASSESSMENT: 0-10

## 2025-06-02 NOTE — ED PROVIDER NOTES
Emergency Department Provider Note       PCP: Pj Hinojosa MD   Age: 39 y.o.   Sex: female     DISPOSITION Decision To Discharge 06/02/2025 02:23:32 AM   DISPOSITION CONDITION Stable            ICD-10-CM    1. Neck pain  M54.2 traMADol (ULTRAM) 50 MG tablet      2. Chest wall pain  R07.89 traMADol (ULTRAM) 50 MG tablet          Medical Decision Making     Muscular chest and neck pain reproducible with palpation and movement better after Toradol.  EKG and 1 set of enzymes is negative.     1 acute, uncomplicated illness or injury.  Over the counter drug management performed.  Prescription drug management performed.  Patient was discharged risks and benefits of hospitalization were considered.  Shared medical decision making was utilized in creating the patients health plan today.    I independently ordered and reviewed each unique test.       I interpreted the X-rays of the chest negative for pneumothorax, pneumonia, pleural effusion.  My Independent EKG Interpretation: sinus rhythm, no evidence of arrhythmia, no acute changes, and non-specific EKG      ST Segments:Normal ST segments - NO STEMI   Rate: 80            History     HISTORY OF PRESENT ILLNESS  Miss Doshi is a 39 y.o. female with a history of diabetes and possible arthritis who presented via self-transport. She reports pain that began Friday and worsened Saturday, originating from the back of her neck on the left side radiating over the trapezius down to the left anterior chest.  Pain is described as as sharp, throbbing pain where the neck meets the chest, now shooting downward.     The pain is aggravated by movement or changing positions but not by deep breaths, and she denies shortness of breath, chest pain (except as described above), abdominal pain, nausea, vomiting, fevers, or chills. She reports a mild cough and denies prior blood clots in the leg or lung.     She received 800 mg ibuprofen from her aunt on Sunday, which provided some relief.

## 2025-06-02 NOTE — ED TRIAGE NOTES
Pt to ED with c/o cough, chest pain, and neck pain. Pt states started Friday. Pt states coughing yesterday am until she thought she may vomit but no nausea or vomiting. PT denies shortness of breath. Pt denies sick contacts. Pt states nagging pain that's not going away. Pt alert ambulatory and in no acute distress at this time.

## 2025-06-02 NOTE — PROGRESS NOTES
Patient mobility status  with no difficulty.     I have reviewed discharge instructions with the patient.  The patient verbalized understanding.    Patient left ED via Discharge Method: ambulatory to Home with Child.    Opportunity for questions and clarification provided.     Patient given 3 scripts.

## 2025-06-03 ENCOUNTER — HOSPITAL ENCOUNTER (EMERGENCY)
Age: 40
Discharge: HOME | End: 2025-06-03
Attending: EMERGENCY MEDICINE
Payer: MEDICAID

## 2025-06-03 VITALS
TEMPERATURE: 98.6 F | DIASTOLIC BLOOD PRESSURE: 99 MMHG | HEIGHT: 66 IN | HEART RATE: 86 BPM | WEIGHT: 293 LBS | OXYGEN SATURATION: 100 % | RESPIRATION RATE: 20 BRPM | SYSTOLIC BLOOD PRESSURE: 160 MMHG | BODY MASS INDEX: 47.09 KG/M2

## 2025-06-03 DIAGNOSIS — E11.9 TYPE 2 DIABETES MELLITUS WITHOUT COMPLICATION, WITHOUT LONG-TERM CURRENT USE OF INSULIN (HCC): ICD-10-CM

## 2025-06-03 DIAGNOSIS — R73.9 HYPERGLYCEMIA: Primary | ICD-10-CM

## 2025-06-03 LAB
ALBUMIN SERPL-MCNC: 4 G/DL (ref 3.5–5)
ALBUMIN/GLOB SERPL: 1.2 (ref 1–1.9)
ALP SERPL-CCNC: 117 U/L (ref 35–104)
ALT SERPL-CCNC: 23 U/L (ref 12–65)
ANION GAP SERPL CALC-SCNC: 14 MMOL/L (ref 7–16)
APPEARANCE UR: ABNORMAL
AST SERPL-CCNC: 25 U/L (ref 15–37)
BASOPHILS # BLD: 0.02 K/UL (ref 0–0.2)
BASOPHILS NFR BLD: 0.2 % (ref 0–2)
BILIRUB SERPL-MCNC: 0.3 MG/DL (ref 0–1.2)
BILIRUB UR QL: NEGATIVE
BUN SERPL-MCNC: 13 MG/DL (ref 6–23)
CALCIUM SERPL-MCNC: 9.3 MG/DL (ref 8.8–10.2)
CHLORIDE SERPL-SCNC: 98 MMOL/L (ref 98–107)
CO2 SERPL-SCNC: 19 MMOL/L (ref 20–29)
COLOR UR: ABNORMAL
CREAT SERPL-MCNC: 0.74 MG/DL (ref 0.8–1.3)
DIFFERENTIAL METHOD BLD: ABNORMAL
EOSINOPHIL # BLD: 0.01 K/UL (ref 0–0.8)
EOSINOPHIL NFR BLD: 0.1 % (ref 0.5–7.8)
ERYTHROCYTE [DISTWIDTH] IN BLOOD BY AUTOMATED COUNT: 12.5 % (ref 11.9–14.6)
GLOBULIN SER CALC-MCNC: 3.4 G/DL (ref 2.3–3.5)
GLUCOSE BLD STRIP.AUTO-MCNC: 237 MG/DL (ref 65–100)
GLUCOSE BLD STRIP.AUTO-MCNC: 276 MG/DL (ref 65–100)
GLUCOSE BLD STRIP.AUTO-MCNC: 317 MG/DL (ref 65–100)
GLUCOSE BLD STRIP.AUTO-MCNC: 582 MG/DL (ref 65–100)
GLUCOSE SERPL-MCNC: 633 MG/DL (ref 65–100)
GLUCOSE UR STRIP.AUTO-MCNC: >1000 MG/DL
HCT VFR BLD AUTO: 37.3 % (ref 35.8–46.3)
HGB BLD-MCNC: 12.8 G/DL (ref 11.7–15.4)
HGB UR QL STRIP: NEGATIVE
IMM GRANULOCYTES # BLD AUTO: 0.04 K/UL (ref 0–0.5)
IMM GRANULOCYTES NFR BLD AUTO: 0.4 % (ref 0–5)
KETONES UR QL STRIP.AUTO: 15 MG/DL
LEUKOCYTE ESTERASE UR QL STRIP.AUTO: NEGATIVE
LYMPHOCYTES # BLD: 1.6 K/UL (ref 0.5–4.6)
LYMPHOCYTES NFR BLD: 15.6 % (ref 13–44)
MCH RBC QN AUTO: 30.6 PG (ref 26.1–32.9)
MCHC RBC AUTO-ENTMCNC: 34.3 G/DL (ref 31.4–35)
MCV RBC AUTO: 89.2 FL (ref 82–102)
MONOCYTES # BLD: 0.48 K/UL (ref 0.1–1.3)
MONOCYTES NFR BLD: 4.7 % (ref 4–12)
NEUTS SEG # BLD: 8.13 K/UL (ref 1.7–8.2)
NEUTS SEG NFR BLD: 79 % (ref 43–78)
NITRITE UR QL STRIP.AUTO: NEGATIVE
NRBC # BLD: 0 K/UL (ref 0–0.2)
PH UR STRIP: 6 (ref 5–9)
PLATELET # BLD AUTO: 268 K/UL (ref 150–450)
PMV BLD AUTO: 11.2 FL (ref 9.4–12.3)
POTASSIUM SERPL-SCNC: 4.4 MMOL/L (ref 3.5–5.1)
PROT SERPL-MCNC: 7.4 G/DL (ref 6.3–8.2)
PROT UR STRIP-MCNC: NEGATIVE MG/DL
RBC # BLD AUTO: 4.18 M/UL (ref 4.05–5.2)
SERVICE CMNT-IMP: ABNORMAL
SODIUM SERPL-SCNC: 131 MMOL/L (ref 133–143)
SP GR UR REFRACTOMETRY: <=1.005 (ref 1–1.02)
UROBILINOGEN UR QL STRIP.AUTO: 0.2 EU/DL (ref 0.2–1)
WBC # BLD AUTO: 10.3 K/UL (ref 4.3–11.1)

## 2025-06-03 PROCEDURE — 96376 TX/PRO/DX INJ SAME DRUG ADON: CPT

## 2025-06-03 PROCEDURE — 2580000003 HC RX 258: Performed by: EMERGENCY MEDICINE

## 2025-06-03 PROCEDURE — 81003 URINALYSIS AUTO W/O SCOPE: CPT

## 2025-06-03 PROCEDURE — 82962 GLUCOSE BLOOD TEST: CPT

## 2025-06-03 PROCEDURE — 99284 EMERGENCY DEPT VISIT MOD MDM: CPT

## 2025-06-03 PROCEDURE — 2580000003 HC RX 258

## 2025-06-03 PROCEDURE — 96374 THER/PROPH/DIAG INJ IV PUSH: CPT

## 2025-06-03 PROCEDURE — 80053 COMPREHEN METABOLIC PANEL: CPT

## 2025-06-03 PROCEDURE — 96361 HYDRATE IV INFUSION ADD-ON: CPT

## 2025-06-03 PROCEDURE — 6370000000 HC RX 637 (ALT 250 FOR IP): Performed by: EMERGENCY MEDICINE

## 2025-06-03 PROCEDURE — 85025 COMPLETE CBC W/AUTO DIFF WBC: CPT

## 2025-06-03 RX ORDER — 0.9 % SODIUM CHLORIDE 0.9 %
500 INTRAVENOUS SOLUTION INTRAVENOUS ONCE
Status: COMPLETED | OUTPATIENT
Start: 2025-06-03 | End: 2025-06-03

## 2025-06-03 RX ORDER — 0.9 % SODIUM CHLORIDE 0.9 %
1000 INTRAVENOUS SOLUTION INTRAVENOUS
Status: COMPLETED | OUTPATIENT
Start: 2025-06-03 | End: 2025-06-03

## 2025-06-03 RX ADMIN — SODIUM CHLORIDE 500 ML: 0.9 INJECTION, SOLUTION INTRAVENOUS at 21:49

## 2025-06-03 RX ADMIN — SODIUM CHLORIDE 1000 ML: 0.9 INJECTION, SOLUTION INTRAVENOUS at 18:48

## 2025-06-03 RX ADMIN — INSULIN HUMAN 10 UNITS: 100 INJECTION, SOLUTION PARENTERAL at 19:24

## 2025-06-03 RX ADMIN — INSULIN HUMAN 5 UNITS: 100 INJECTION, SOLUTION PARENTERAL at 21:30

## 2025-06-03 ASSESSMENT — PAIN DESCRIPTION - LOCATION: LOCATION: ARM;SHOULDER

## 2025-06-03 ASSESSMENT — PAIN SCALES - GENERAL: PAINLEVEL_OUTOF10: 7

## 2025-06-03 ASSESSMENT — PAIN DESCRIPTION - ORIENTATION: ORIENTATION: LEFT

## 2025-06-03 ASSESSMENT — PAIN - FUNCTIONAL ASSESSMENT: PAIN_FUNCTIONAL_ASSESSMENT: 0-10

## 2025-06-03 NOTE — ED PROVIDER NOTES
Emergency Department Provider Note       S EMERGENCY DEPT   PCP: Pj Hinojosa MD   Age: 39 y.o.   Sex: female     DISPOSITION Decision To Discharge 06/03/2025 09:59:35 PM    ICD-10-CM    1. Hyperglycemia  R73.9     Steroid exacerbate      2. Type 2 diabetes mellitus without complication, without long-term current use of insulin ([facility])  E11.9           Medical Decision Making   Hyperglycemia.  Probably steroid-induced.  Check blood sugar.  IV fluids.  IV insulin.  No symptoms of infection other than name check urine for polyuria.  Urine clean.  No signs of DKA or infection.     1 or more acute illnesses that pose a threat to life or bodily function.   Prescription drug management performed.  Chronic medical problems impacting care include type 2 diabetes.  Shared medical decision making was utilized in creating the patients health plan today.  I independently ordered and reviewed each unique test.    I reviewed external records: provider visit note from PCP.  I reviewed external records: provider visit note from outside specialist.   Primary care doctor notes regarding high blood pressure and adult onset diabetes.  Previous emergency department notes last year for episodes of hypoglycemia.    History     39-year-old female comes in by private vehicle plaint of elevated blood sugar.  She does have type 2 diabetes.  States she takes 500 metformin daily.  Also takes antihypertensive medications.  States her blood sugars usually run around 120.  States she was seen yesterday for some musculoskeletal chest and back pain.  She was placed on prednisone and started yesterday.  Headache and thirst and polyuria and was noted to have elevated blood sugars at home, as high as 570.  She did take her medications today.  No particular chest pain at this point.  Occasional cough is really not shortness of breath.  No vomiting diaphoresis.  No fever or chills.    The history is provided by the patient.     Physical Exam

## 2025-06-03 NOTE — ED TRIAGE NOTES
Pt to the ED from home with son with c/o with Hyperglycemia. Pt states that she was in the ER a few days ago for shoulder pain and was given a steroid. Pt states that she is a diabetic. FSBS 570 at home. Pt states that she is on oral medications only.

## 2025-06-04 NOTE — DISCHARGE INSTRUCTIONS
Suggest increasing your metformin to 500 mg twice a day.  Stop taking the prednisone.  Prescription for Zanaflex to help out with soreness in chest and back.  Call your doctor to recheck.  Recheck sooner for sugars that stay over 400 despite stopping the prednisone and doubling your medication.